# Patient Record
Sex: FEMALE | Race: WHITE | NOT HISPANIC OR LATINO | Employment: PART TIME | URBAN - METROPOLITAN AREA
[De-identification: names, ages, dates, MRNs, and addresses within clinical notes are randomized per-mention and may not be internally consistent; named-entity substitution may affect disease eponyms.]

---

## 2017-06-07 ENCOUNTER — APPOINTMENT (EMERGENCY)
Dept: RADIOLOGY | Facility: HOSPITAL | Age: 27
End: 2017-06-07
Payer: COMMERCIAL

## 2017-06-07 ENCOUNTER — HOSPITAL ENCOUNTER (EMERGENCY)
Facility: HOSPITAL | Age: 27
Discharge: HOME | End: 2017-06-07
Admitting: EMERGENCY MEDICINE
Payer: COMMERCIAL

## 2017-06-07 VITALS
TEMPERATURE: 99.3 F | OXYGEN SATURATION: 99 % | WEIGHT: 121.4 LBS | HEART RATE: 77 BPM | SYSTOLIC BLOOD PRESSURE: 135 MMHG | RESPIRATION RATE: 20 BRPM | DIASTOLIC BLOOD PRESSURE: 77 MMHG

## 2017-06-07 DIAGNOSIS — R07.9 CHEST PAIN: Primary | ICD-10-CM

## 2017-06-07 LAB
ALBUMIN SERPL BCP-MCNC: 3.8 G/DL (ref 3.5–5)
ALP SERPL-CCNC: 84 U/L (ref 46–116)
ALT SERPL W P-5'-P-CCNC: 31 U/L (ref 12–78)
ANION GAP SERPL CALCULATED.3IONS-SCNC: 9 MMOL/L (ref 4–13)
APTT PPP: 30 SECONDS (ref 24–33)
AST SERPL W P-5'-P-CCNC: 34 U/L (ref 5–45)
BASOPHILS # BLD AUTO: 0 THOUSANDS/ΜL (ref 0–0.1)
BASOPHILS NFR BLD AUTO: 0 % (ref 0–1)
BILIRUB SERPL-MCNC: 0.4 MG/DL (ref 0.2–1)
BUN SERPL-MCNC: 7 MG/DL (ref 5–25)
CALCIUM SERPL-MCNC: 8.6 MG/DL (ref 8.3–10.1)
CHLORIDE SERPL-SCNC: 101 MMOL/L (ref 100–108)
CO2 SERPL-SCNC: 29 MMOL/L (ref 21–32)
CREAT SERPL-MCNC: 0.71 MG/DL (ref 0.6–1.3)
CRP SERPL QL: <3 MG/L
DEPRECATED D DIMER PPP: 350 NG/ML (FEU) (ref 190–520)
EOSINOPHIL # BLD AUTO: 0.5 THOUSAND/ΜL (ref 0–0.61)
EOSINOPHIL NFR BLD AUTO: 6 % (ref 0–6)
ERYTHROCYTE [DISTWIDTH] IN BLOOD BY AUTOMATED COUNT: 13.8 % (ref 11.6–15.1)
ERYTHROCYTE [SEDIMENTATION RATE] IN BLOOD: 8 MM/HOUR (ref 2–25)
GFR SERPL CREATININE-BSD FRML MDRD: >60 ML/MIN/1.73SQ M
GLUCOSE SERPL-MCNC: 100 MG/DL (ref 65–140)
HCT VFR BLD AUTO: 38.8 % (ref 37–47)
HGB BLD-MCNC: 12.9 G/DL (ref 12–16)
INR PPP: 1.08 (ref 0.86–1.16)
LIPASE SERPL-CCNC: 68 U/L (ref 73–393)
LYMPHOCYTES # BLD AUTO: 3.8 THOUSANDS/ΜL (ref 0.6–4.47)
LYMPHOCYTES NFR BLD AUTO: 46 % (ref 14–44)
MCH RBC QN AUTO: 29.3 PG (ref 27–31)
MCHC RBC AUTO-ENTMCNC: 33.3 G/DL (ref 31.4–37.4)
MCV RBC AUTO: 88 FL (ref 82–98)
MONOCYTES # BLD AUTO: 0.7 THOUSAND/ΜL (ref 0.17–1.22)
MONOCYTES NFR BLD AUTO: 8 % (ref 4–12)
NEUTROPHILS # BLD AUTO: 3.3 THOUSANDS/ΜL (ref 1.85–7.62)
NEUTS SEG NFR BLD AUTO: 40 % (ref 43–75)
NRBC BLD AUTO-RTO: 0 /100 WBCS
NT-PROBNP SERPL-MCNC: 51 PG/ML
PLATELET # BLD AUTO: 272 THOUSANDS/UL (ref 130–400)
PMV BLD AUTO: 8.4 FL (ref 8.9–12.7)
POTASSIUM SERPL-SCNC: 3.6 MMOL/L (ref 3.5–5.3)
PROT SERPL-MCNC: 8.2 G/DL (ref 6.4–8.2)
PROTHROMBIN TIME: 11.4 SECONDS (ref 9.4–11.7)
RBC # BLD AUTO: 4.41 MILLION/UL (ref 4.2–5.4)
SODIUM SERPL-SCNC: 139 MMOL/L (ref 136–145)
TROPONIN I SERPL-MCNC: <0.02 NG/ML
WBC # BLD AUTO: 8.3 THOUSAND/UL (ref 4.8–10.8)

## 2017-06-07 PROCEDURE — 85025 COMPLETE CBC W/AUTO DIFF WBC: CPT | Performed by: PHYSICIAN ASSISTANT

## 2017-06-07 PROCEDURE — 85379 FIBRIN DEGRADATION QUANT: CPT | Performed by: PHYSICIAN ASSISTANT

## 2017-06-07 PROCEDURE — 86140 C-REACTIVE PROTEIN: CPT | Performed by: PHYSICIAN ASSISTANT

## 2017-06-07 PROCEDURE — 87040 BLOOD CULTURE FOR BACTERIA: CPT | Performed by: PHYSICIAN ASSISTANT

## 2017-06-07 PROCEDURE — 99285 EMERGENCY DEPT VISIT HI MDM: CPT

## 2017-06-07 PROCEDURE — 71020 HB CHEST X-RAY 2VW FRONTAL&LATL: CPT

## 2017-06-07 PROCEDURE — 83690 ASSAY OF LIPASE: CPT | Performed by: PHYSICIAN ASSISTANT

## 2017-06-07 PROCEDURE — 85730 THROMBOPLASTIN TIME PARTIAL: CPT | Performed by: PHYSICIAN ASSISTANT

## 2017-06-07 PROCEDURE — 80053 COMPREHEN METABOLIC PANEL: CPT | Performed by: PHYSICIAN ASSISTANT

## 2017-06-07 PROCEDURE — 84484 ASSAY OF TROPONIN QUANT: CPT | Performed by: PHYSICIAN ASSISTANT

## 2017-06-07 PROCEDURE — 36415 COLL VENOUS BLD VENIPUNCTURE: CPT | Performed by: PHYSICIAN ASSISTANT

## 2017-06-07 PROCEDURE — 85610 PROTHROMBIN TIME: CPT | Performed by: PHYSICIAN ASSISTANT

## 2017-06-07 PROCEDURE — 85652 RBC SED RATE AUTOMATED: CPT | Performed by: PHYSICIAN ASSISTANT

## 2017-06-07 PROCEDURE — 83880 ASSAY OF NATRIURETIC PEPTIDE: CPT | Performed by: PHYSICIAN ASSISTANT

## 2017-06-07 PROCEDURE — 93005 ELECTROCARDIOGRAM TRACING: CPT | Performed by: PHYSICIAN ASSISTANT

## 2017-06-12 LAB
BACTERIA BLD CULT: NORMAL
BACTERIA BLD CULT: NORMAL

## 2017-06-15 LAB
ATRIAL RATE: 82 BPM
P AXIS: 77 DEGREES
PR INTERVAL: 132 MS
QRS AXIS: 77 DEGREES
QRSD INTERVAL: 88 MS
QT INTERVAL: 368 MS
QTC INTERVAL: 429 MS
T WAVE AXIS: 28 DEGREES
VENTRICULAR RATE: 82 BPM

## 2017-07-12 ENCOUNTER — HOSPITAL ENCOUNTER (EMERGENCY)
Facility: HOSPITAL | Age: 27
Discharge: HOME | End: 2017-07-12
Payer: COMMERCIAL

## 2017-07-12 VITALS
TEMPERATURE: 98 F | WEIGHT: 120 LBS | HEART RATE: 93 BPM | RESPIRATION RATE: 18 BRPM | BODY MASS INDEX: 19.29 KG/M2 | SYSTOLIC BLOOD PRESSURE: 134 MMHG | OXYGEN SATURATION: 94 % | DIASTOLIC BLOOD PRESSURE: 74 MMHG | HEIGHT: 66 IN

## 2017-07-12 DIAGNOSIS — K02.9 TOOTH DECAY: Primary | ICD-10-CM

## 2017-07-12 PROCEDURE — 99282 EMERGENCY DEPT VISIT SF MDM: CPT

## 2017-07-12 RX ORDER — CLINDAMYCIN HYDROCHLORIDE 300 MG/1
300 CAPSULE ORAL 4 TIMES DAILY
Qty: 40 CAPSULE | Refills: 0 | Status: SHIPPED | OUTPATIENT
Start: 2017-07-12 | End: 2017-07-22

## 2017-07-19 ENCOUNTER — APPOINTMENT (EMERGENCY)
Dept: RADIOLOGY | Facility: HOSPITAL | Age: 27
End: 2017-07-19
Payer: COMMERCIAL

## 2017-07-19 ENCOUNTER — HOSPITAL ENCOUNTER (EMERGENCY)
Facility: HOSPITAL | Age: 27
Discharge: HOME | End: 2017-07-19
Attending: EMERGENCY MEDICINE | Admitting: EMERGENCY MEDICINE
Payer: COMMERCIAL

## 2017-07-19 VITALS
OXYGEN SATURATION: 100 % | RESPIRATION RATE: 18 BRPM | BODY MASS INDEX: 19.37 KG/M2 | TEMPERATURE: 98 F | WEIGHT: 120 LBS | SYSTOLIC BLOOD PRESSURE: 113 MMHG | DIASTOLIC BLOOD PRESSURE: 70 MMHG | HEART RATE: 108 BPM

## 2017-07-19 DIAGNOSIS — F11.90 HEROIN USE: Primary | ICD-10-CM

## 2017-07-19 DIAGNOSIS — F41.9 ANXIETY: ICD-10-CM

## 2017-07-19 LAB — HCG UR QL: NEGATIVE

## 2017-07-19 PROCEDURE — 71020 HB CHEST X-RAY 2VW FRONTAL&LATL: CPT

## 2017-07-19 PROCEDURE — 93005 ELECTROCARDIOGRAM TRACING: CPT | Performed by: EMERGENCY MEDICINE

## 2017-07-19 PROCEDURE — 99285 EMERGENCY DEPT VISIT HI MDM: CPT

## 2017-07-19 PROCEDURE — 81025 URINE PREGNANCY TEST: CPT | Performed by: EMERGENCY MEDICINE

## 2017-07-19 RX ORDER — ALBUTEROL SULFATE 90 UG/1
1-2 AEROSOL, METERED RESPIRATORY (INHALATION) EVERY 6 HOURS PRN
Qty: 1 INHALER | Refills: 0 | Status: SHIPPED | OUTPATIENT
Start: 2017-07-19

## 2017-07-29 LAB
ATRIAL RATE: 84 BPM
P AXIS: 83 DEGREES
PR INTERVAL: 128 MS
QRS AXIS: 81 DEGREES
QRSD INTERVAL: 84 MS
QT INTERVAL: 364 MS
QTC INTERVAL: 430 MS
T WAVE AXIS: 8 DEGREES
VENTRICULAR RATE: 84 BPM

## 2017-11-09 ENCOUNTER — APPOINTMENT (EMERGENCY)
Dept: RADIOLOGY | Facility: HOSPITAL | Age: 27
End: 2017-11-09
Payer: COMMERCIAL

## 2017-11-09 ENCOUNTER — HOSPITAL ENCOUNTER (OUTPATIENT)
Facility: HOSPITAL | Age: 27
Setting detail: OBSERVATION
Discharge: LEFT BEFORE DISCHARGE | End: 2017-11-09
Attending: EMERGENCY MEDICINE | Admitting: FAMILY MEDICINE
Payer: COMMERCIAL

## 2017-11-09 ENCOUNTER — APPOINTMENT (OUTPATIENT)
Dept: NON INVASIVE DIAGNOSTICS | Facility: HOSPITAL | Age: 27
End: 2017-11-09
Attending: FAMILY MEDICINE
Payer: COMMERCIAL

## 2017-11-09 VITALS
DIASTOLIC BLOOD PRESSURE: 62 MMHG | HEART RATE: 72 BPM | RESPIRATION RATE: 12 BRPM | WEIGHT: 118 LBS | BODY MASS INDEX: 19.66 KG/M2 | HEIGHT: 65 IN | SYSTOLIC BLOOD PRESSURE: 109 MMHG | OXYGEN SATURATION: 99 % | TEMPERATURE: 98.1 F

## 2017-11-09 DIAGNOSIS — K04.7 DENTAL ABSCESS: ICD-10-CM

## 2017-11-09 DIAGNOSIS — Z86.79 HISTORY OF ENDOCARDITIS: ICD-10-CM

## 2017-11-09 DIAGNOSIS — R07.9 CHEST PAIN: Primary | ICD-10-CM

## 2017-11-09 PROBLEM — R35.0 URINARY FREQUENCY: Status: ACTIVE | Noted: 2017-11-09

## 2017-11-09 PROBLEM — F19.90 IV DRUG USER: Status: ACTIVE | Noted: 2017-11-09

## 2017-11-09 PROBLEM — K08.89 TOOTHACHE: Status: ACTIVE | Noted: 2017-11-09

## 2017-11-09 PROBLEM — Z72.0 TOBACCO USE: Status: ACTIVE | Noted: 2017-11-09

## 2017-11-09 LAB
ALBUMIN SERPL BCP-MCNC: 3.7 G/DL (ref 3.5–5)
ALP SERPL-CCNC: 82 U/L (ref 46–116)
ALT SERPL W P-5'-P-CCNC: 46 U/L (ref 12–78)
ANION GAP SERPL CALCULATED.3IONS-SCNC: 10 MMOL/L (ref 4–13)
AST SERPL W P-5'-P-CCNC: 36 U/L (ref 5–45)
BASOPHILS # BLD AUTO: 0 THOUSANDS/ΜL (ref 0–0.1)
BASOPHILS NFR BLD AUTO: 1 % (ref 0–1)
BILIRUB SERPL-MCNC: 0.2 MG/DL (ref 0.2–1)
BUN SERPL-MCNC: 9 MG/DL (ref 5–25)
CALCIUM SERPL-MCNC: 9.1 MG/DL (ref 8.3–10.1)
CHLORIDE SERPL-SCNC: 103 MMOL/L (ref 100–108)
CHOLEST SERPL-MCNC: 132 MG/DL (ref 50–200)
CO2 SERPL-SCNC: 27 MMOL/L (ref 21–32)
CREAT SERPL-MCNC: 0.53 MG/DL (ref 0.6–1.3)
EOSINOPHIL # BLD AUTO: 0.4 THOUSAND/ΜL (ref 0–0.61)
EOSINOPHIL NFR BLD AUTO: 5 % (ref 0–6)
ERYTHROCYTE [DISTWIDTH] IN BLOOD BY AUTOMATED COUNT: 14.8 % (ref 11.6–15.1)
EXT PREG TEST URINE: NEGATIVE
GFR SERPL CREATININE-BSD FRML MDRD: 129 ML/MIN/1.73SQ M
GLUCOSE SERPL-MCNC: 101 MG/DL (ref 65–140)
HCT VFR BLD AUTO: 33.5 % (ref 37–47)
HDLC SERPL-MCNC: 30 MG/DL (ref 40–60)
HGB BLD-MCNC: 11.1 G/DL (ref 12–16)
LDLC SERPL CALC-MCNC: 73 MG/DL (ref 0–100)
LYMPHOCYTES # BLD AUTO: 3.7 THOUSANDS/ΜL (ref 0.6–4.47)
LYMPHOCYTES NFR BLD AUTO: 53 % (ref 14–44)
MCH RBC QN AUTO: 29.7 PG (ref 27–31)
MCHC RBC AUTO-ENTMCNC: 33.1 G/DL (ref 31.4–37.4)
MCV RBC AUTO: 90 FL (ref 82–98)
MONOCYTES # BLD AUTO: 0.5 THOUSAND/ΜL (ref 0.17–1.22)
MONOCYTES NFR BLD AUTO: 8 % (ref 4–12)
NEUTROPHILS # BLD AUTO: 2.4 THOUSANDS/ΜL (ref 1.85–7.62)
NEUTS SEG NFR BLD AUTO: 34 % (ref 43–75)
NRBC BLD AUTO-RTO: 0 /100 WBCS
PLATELET # BLD AUTO: 216 THOUSANDS/UL (ref 130–400)
PLATELET BLD QL SMEAR: ADEQUATE
PMV BLD AUTO: 8.7 FL (ref 8.9–12.7)
POTASSIUM SERPL-SCNC: 3.9 MMOL/L (ref 3.5–5.3)
PROT SERPL-MCNC: 8.1 G/DL (ref 6.4–8.2)
RBC # BLD AUTO: 3.75 MILLION/UL (ref 4.2–5.4)
SODIUM SERPL-SCNC: 140 MMOL/L (ref 136–145)
TRIGL SERPL-MCNC: 143 MG/DL
TROPONIN I SERPL-MCNC: <0.02 NG/ML
WBC # BLD AUTO: 7 THOUSAND/UL (ref 4.8–10.8)

## 2017-11-09 PROCEDURE — 96361 HYDRATE IV INFUSION ADD-ON: CPT

## 2017-11-09 PROCEDURE — 81025 URINE PREGNANCY TEST: CPT | Performed by: EMERGENCY MEDICINE

## 2017-11-09 PROCEDURE — 80061 LIPID PANEL: CPT | Performed by: FAMILY MEDICINE

## 2017-11-09 PROCEDURE — 80053 COMPREHEN METABOLIC PANEL: CPT | Performed by: EMERGENCY MEDICINE

## 2017-11-09 PROCEDURE — 96365 THER/PROPH/DIAG IV INF INIT: CPT

## 2017-11-09 PROCEDURE — 99285 EMERGENCY DEPT VISIT HI MDM: CPT

## 2017-11-09 PROCEDURE — 96375 TX/PRO/DX INJ NEW DRUG ADDON: CPT

## 2017-11-09 PROCEDURE — 71275 CT ANGIOGRAPHY CHEST: CPT

## 2017-11-09 PROCEDURE — 85025 COMPLETE CBC W/AUTO DIFF WBC: CPT | Performed by: EMERGENCY MEDICINE

## 2017-11-09 PROCEDURE — 93005 ELECTROCARDIOGRAM TRACING: CPT | Performed by: EMERGENCY MEDICINE

## 2017-11-09 PROCEDURE — 84484 ASSAY OF TROPONIN QUANT: CPT | Performed by: EMERGENCY MEDICINE

## 2017-11-09 PROCEDURE — 36415 COLL VENOUS BLD VENIPUNCTURE: CPT | Performed by: EMERGENCY MEDICINE

## 2017-11-09 PROCEDURE — 71020 HB CHEST X-RAY 2VW FRONTAL&LATL: CPT

## 2017-11-09 RX ORDER — CLINDAMYCIN HYDROCHLORIDE 150 MG/1
450 CAPSULE ORAL EVERY 8 HOURS SCHEDULED
Qty: 63 CAPSULE | Refills: 0 | Status: SHIPPED | OUTPATIENT
Start: 2017-11-09 | End: 2017-11-16

## 2017-11-09 RX ORDER — NICOTINE 21 MG/24HR
1 PATCH, TRANSDERMAL 24 HOURS TRANSDERMAL DAILY
Qty: 28 PATCH | Refills: 0 | Status: SHIPPED | OUTPATIENT
Start: 2017-11-09 | End: 2021-06-16

## 2017-11-09 RX ORDER — ALBUTEROL SULFATE 90 UG/1
2 AEROSOL, METERED RESPIRATORY (INHALATION) EVERY 4 HOURS PRN
Status: DISCONTINUED | OUTPATIENT
Start: 2017-11-09 | End: 2017-11-09 | Stop reason: HOSPADM

## 2017-11-09 RX ORDER — ONDANSETRON 2 MG/ML
4 INJECTION INTRAMUSCULAR; INTRAVENOUS EVERY 6 HOURS PRN
Status: DISCONTINUED | OUTPATIENT
Start: 2017-11-09 | End: 2017-11-09 | Stop reason: HOSPADM

## 2017-11-09 RX ORDER — NICOTINE 21 MG/24HR
1 PATCH, TRANSDERMAL 24 HOURS TRANSDERMAL DAILY
Status: DISCONTINUED | OUTPATIENT
Start: 2017-11-09 | End: 2017-11-09 | Stop reason: HOSPADM

## 2017-11-09 RX ORDER — CLINDAMYCIN PHOSPHATE 600 MG/50ML
600 INJECTION INTRAVENOUS EVERY 8 HOURS
Status: DISCONTINUED | OUTPATIENT
Start: 2017-11-09 | End: 2017-11-09 | Stop reason: HOSPADM

## 2017-11-09 RX ORDER — KETOROLAC TROMETHAMINE 30 MG/ML
30 INJECTION, SOLUTION INTRAMUSCULAR; INTRAVENOUS EVERY 6 HOURS PRN
Status: DISCONTINUED | OUTPATIENT
Start: 2017-11-09 | End: 2017-11-09 | Stop reason: HOSPADM

## 2017-11-09 RX ORDER — ASPIRIN 81 MG/1
324 TABLET, CHEWABLE ORAL DAILY
Status: DISCONTINUED | OUTPATIENT
Start: 2017-11-09 | End: 2017-11-09 | Stop reason: HOSPADM

## 2017-11-09 RX ORDER — KETOROLAC TROMETHAMINE 30 MG/ML
15 INJECTION, SOLUTION INTRAMUSCULAR; INTRAVENOUS ONCE
Status: COMPLETED | OUTPATIENT
Start: 2017-11-09 | End: 2017-11-09

## 2017-11-09 RX ORDER — CLINDAMYCIN PHOSPHATE 600 MG/50ML
600 INJECTION INTRAVENOUS ONCE
Status: COMPLETED | OUTPATIENT
Start: 2017-11-09 | End: 2017-11-09

## 2017-11-09 RX ORDER — ACETAMINOPHEN 325 MG/1
650 TABLET ORAL EVERY 4 HOURS PRN
Status: DISCONTINUED | OUTPATIENT
Start: 2017-11-09 | End: 2017-11-09 | Stop reason: HOSPADM

## 2017-11-09 RX ADMIN — SODIUM CHLORIDE 1000 ML: 0.9 INJECTION, SOLUTION INTRAVENOUS at 05:26

## 2017-11-09 RX ADMIN — CLINDAMYCIN PHOSPHATE 600 MG: 12 INJECTION, SOLUTION INTRAMUSCULAR; INTRAVENOUS at 06:36

## 2017-11-09 RX ADMIN — IOHEXOL 85 ML: 350 INJECTION, SOLUTION INTRAVENOUS at 06:56

## 2017-11-09 RX ADMIN — KETOROLAC TROMETHAMINE 15 MG: 30 INJECTION, SOLUTION INTRAMUSCULAR at 05:27

## 2017-11-09 NOTE — H&P
History and Physical - [facility]    Patient Information: Bernard Majano 32 y o  female MRN: [id number]  Unit/Bed#: [address] [address] Encounter: [id number]  Admitting Physician: Maximo Hyde DO  PCP: No primary care provider on file  Date of Admission:  11/09/17        Hospital Problem List:     Principal Problem:    Chest pain  Active Problems:    Tobacco use    IV drug user    Urinary frequency    Tooth infection      Assessment/Plan:    1  Chest pain - admit patient to telemetry  Troponin x1 has been negative  Obtain troponin x2 More while here  Consult Cardiology  Place patient on aspirin  Obtain exercise stress test and echocardiogram for further evaluation  Monitor closely for any worsening of pain  CTA chest ruled out PE     2  Urinary frequency - check U/A to r/o UTI  No fevers and no leukocytosis    3  Tooth infection - patient received a dose of IV clindamycin in the ER  Will continue with IV clindamycin while here  advised patient to set up an appointment with a dentist as soon as possible for further management of this    4  IV drug user - patient advised to stop using drugs  She denies any other drug use besides heroin  recommended outpatient rehab/counseling  Offered psychiatry consultation or meeting with Suzan Martel which patient refused as she is not interested in quitting at this time  5  Tobacco use - nicotine patch  Smoking cessation d/w patient    6  Right lower lobe nodule - incidental finding on CTA chest   Patient advised to get established with a PCP who can continue monitoring this if appropriate  VTE Prophylaxis: Enoxaparin (Lovenox)  / sequential compression device   Code Status: Level 1 - Full Code    Anticipated Length of Stay:  Patient will be admitted on an Observation basis with an anticipated length of stay of 1 midnights  Total Time for Visit, including Counseling / Coordination of Care: 45 minutes    Greater than 50% of this total time spent on direct patient counseling and coordination of care  Chief Complaint:     Chest Pain (patient has had intermittant left side chest pain all day, worsened when she went to lay down, describes as tightness and cramping)    of Present Illness:    Julito Zhou is a 32 y o  female who presents with intermittent left-sided chest pain x2 years which worsened yesterday  She states that the pain is under her left breast and feels like a contraction in my chest  She states that the pain level was 7-8/10 in intensity  She also reports shortness of breath with it  Denies any radiation of the pain  Patient had similar pain when she was diagnosed with endocarditis about 2 years ago  Patient denies any fevers, chills but does report a tooth infection that started recently  patient uses IV heroin 2-3 times daily  She states that she does not share needles but does not use a clean needle each time either  Her last heroin use was yesterday  Review of Systems:    Review of Systems   Constitutional: Negative for appetite change, chills and fever  HENT: Positive for dental problem  Negative for facial swelling, nosebleeds, sore throat and trouble swallowing  Eyes: Negative for photophobia and visual disturbance  Respiratory: Positive for shortness of breath  Negative for chest tightness  Cardiovascular: Positive for chest pain  Negative for palpitations and leg swelling  Gastrointestinal: Negative for abdominal pain, blood in stool, nausea and vomiting  Genitourinary: Positive for frequency  Negative for dysuria and hematuria  Musculoskeletal: Negative for neck stiffness  Skin: Negative for wound  Neurological: Negative for dizziness, syncope and speech difficulty  Hematological: Does not bruise/bleed easily  Psychiatric/Behavioral: Negative for agitation         Past Medical and Surgical History:     Past Medical History:   Diagnosis Date    Drug abuse, IV     Endocarditis Past Surgical History:   Procedure Laterality Date    TONSILLECTOMY         Meds/Allergies:    PTA meds:   Prior to Admission Medications   Prescriptions Last Dose Informant Patient Reported? Taking? albuterol (PROVENTIL HFA,VENTOLIN HFA) 90 mcg/act inhaler   No No   Sig: Inhale 1-2 puffs every 6 (six) hours as needed for wheezing      Facility-Administered Medications: None       Allergies: No Known Allergies  History:     Marital Status: Single     Substance Use History:   History   Alcohol Use    Yes     Comment: occasional     History   Smoking Status    Current Every Day Smoker    Packs/day: 0 50   Smokeless Tobacco    Never Used     History   Drug Use    Types: Heroin     Comment: last use of heroin today, 2 bags       Family History:    History reviewed  No pertinent family history  Physical Exam:     Vitals:   Blood Pressure: 109/62 (11/09/17 0911)  Pulse: 72 (11/09/17 0911)  Temperature: 98 1 °F (36 7 °C) (11/09/17 0911)  Temp Source: Oral (11/09/17 0911)  Respirations: 12 (11/09/17 0911)  Height: 5' 5" (165 1 cm) (11/09/17 0418)  Weight - Scale: 53 5 kg (118 lb) (11/09/17 0418)  SpO2: 99 % (11/09/17 0911)    Physical Exam   Constitutional: She is oriented to person, place, and time  She appears well-developed and well-nourished  No distress  HENT:   Head: Normocephalic and atraumatic  Poor dentition  Broken tooth noted  (+) mild tenderness noted along the lower gum line and right mandible with no surrounding erythema, warmth  No fluctuance noted   Eyes: EOM are normal  Right eye exhibits no discharge  Left eye exhibits no discharge  No scleral icterus  Neck: Neck supple  No tracheal deviation present  Cardiovascular: Normal rate and regular rhythm  Pulmonary/Chest: Effort normal and breath sounds normal  No respiratory distress  She has no wheezes  She has no rales  Abdominal: Soft  Bowel sounds are normal  She exhibits no distension  There is no tenderness     Musculoskeletal: She exhibits no edema  Neurological: She is alert and oriented to person, place, and time  No cranial nerve deficit  Skin: Skin is dry  She is not diaphoretic  Psychiatric: She has a normal mood and affect  Lab Results: I have personally reviewed pertinent reports  Results from last 7 days  Lab Units 11/09/17  0515   WBC Thousand/uL 7 00   HEMOGLOBIN g/dL 11 1*   HEMATOCRIT % 33 5*   PLATELETS Thousands/uL 216   NEUTROS PCT % 34*   LYMPHS PCT % 53*   MONOS PCT % 8   EOS PCT % 5       Results from last 7 days  Lab Units 11/09/17  0515   SODIUM mmol/L 140   POTASSIUM mmol/L 3 9   CHLORIDE mmol/L 103   CO2 mmol/L 27   BUN mg/dL 9   CREATININE mg/dL 0 53*   CALCIUM mg/dL 9 1   TOTAL PROTEIN g/dL 8 1   BILIRUBIN TOTAL mg/dL 0 20   ALK PHOS U/L 82   ALT U/L 46   AST U/L 36   GLUCOSE RANDOM mg/dL 101           Imaging: I have personally reviewed pertinent reports  Xr Chest 2 Views    Result Date: 11/9/2017  Narrative: CHEST - DUAL ENERGY INDICATION:  Chest pain  COMPARISON:  7/19/2017 VIEWS:  PA (including soft tissue/bone algorithms) and lateral projections IMAGES:  4 FINDINGS:     Cardiomediastinal silhouette appears unremarkable  The lungs are clear  No pneumothorax or pleural effusion  Visualized osseous structures appear within normal limits for the patient's age  Impression: No active pulmonary disease  Workstation performed: [id number]     Fair value Siemens Chest Pe Study    Result Date: 11/9/2017  Narrative: CTA - CHEST WITH IV CONTRAST - PULMONARY ANGIOGRAM INDICATION: Left-sided chest pain  COMPARISON: None  TECHNIQUE: CTA examination of the chest was performed using angiographic technique according to a protocol specifically tailored to evaluate for pulmonary embolism  Reformatted images were created in axial, sagittal, and coronal planes  In addition, coronal 3D MIP postprocessing was performed on the acquisition scanner    Radiation dose length product (DLP) for this visit:  384 43 mGy-cm   This examination, like all CT scans performed in the [facility], was performed utilizing techniques to minimize radiation dose exposure, including the use of iterative  reconstruction and automated exposure control  IV Contrast:  85 mL of iohexol (OMNIPAQUE)      FINDINGS: PULMONARY ARTERIAL TREE:  No pulmonary embolus is seen  LUNGS:  No infiltrates  There is a 5 mm right lower lobe superior segment nodule on image 3/28  Tiny calcified granuloma noted in the right upper lobe  There is no tracheal or endobronchial lesion  PLEURA:  Unremarkable  HEART/AORTA:  Unremarkable for patient's age  MEDIASTINUM AND PATO:  Unremarkable  CHEST WALL AND LOWER NECK:       Normal  VISUALIZED STRUCTURES IN THE UPPER ABDOMEN:  Unremarkable  OSSEOUS STRUCTURES:  No acute fracture or destructive osseous lesion  Impression: No acute pathology  No pulmonary embolism  Right lower lobe 5 mm nodule  Based on current Fleischner Society 2017 Guidelines on incidental pulmonary nodule, in this patient who is less than 39years of age, management decisions should be made on a case by case basis, keeping in mind that infectious causes are more likely than cancer and that use of serial CT should be minimized  Workstation performed: [id number]       CTA ED chest PE study   Final Result      No acute pathology  No pulmonary embolism  Right lower lobe 5 mm nodule  Based on current Fleischner Society 2017 Guidelines on incidental pulmonary nodule, in this patient who is less than 39years of age, management decisions should be made on a case by case basis, keeping in mind that    infectious causes are more likely than cancer and that use of serial CT should be minimized  Workstation performed: [id number]         XR chest 2 views   Final Result      No active pulmonary disease           Workstation performed: [id number]             EKG, Pathology, and Other Studies Reviewed on Admission:   · EKG shows sinus rhythm with no ST elevations    Allscripts Records Reviewed: No     ** Please Note: Dragon 360 Dictation voice to text software may have been used in the creation of this document   **

## 2017-11-09 NOTE — CONSULTS
Consultation - Pulmonary Medicine - Family Medicine Resident  Lissett Taylor 32 y o  female MRN: [id number]  Unit/Bed#: [address] [place] [address] Encounter: [id number]      Assessment/Plan:   32 year female ordered with past medical history of chronic heroin use here for left-sided atypical chest pain:    Atypical chest pain: although pt is low risk for ACS, do need to r/o ACS - stress test and echo ordered; will f/u;  Continue to monitor on tele  C/w daily aspirin until ACS ruled out  Also consider MSK source for the pain  Normocytic Anemia  Chronic heroin user  Hx of endocarditis    Will continue to follow you      History of Present Illness   Physician Requesting Consult: Ivory Ellis DO  Reason for Consult / Principal Problem: chest pain    HPI: Lissett Taylor is a 32y o  year old female who presents with intermittent chest pain  Patient reports that last night she started to experience a muscle spasm like left-sided chest pain just below the left breast area that started as she was lying down, she reports that this pain lasts for couple seconds, it usually resolves when she sits up or leans forward, and starts back again when she lays back down, and she reports this has been intermittent over night  She has no pain at this time  There is no radiation of this pain  There is no associated shortness of breath at rest, however she does get worsening of the pain on deep breathing  No diaphoresis or nausea or vomiting with this pain  Pain is not worsened by exertion or made better by rest   Patient denies any fever or chills  Patient reports that she does have a history of endocarditis 2 years ago up, and was treated by antibiotics after thorough workup, since then she has had no cardiac issues  No abdominal pain  No melena  Patient reports that she is a chronic IV heroin user, she uses 3 bags of heroin every day since the last 2 years, she reports the last time she had a her in was yesterday    She denies any other drug use  She denies any alcohol or smoking  She has not been sick recently, although she does report that she has had tooth infection since past 2 days (not on any antibiotics), she reports that she gets recurrent tooth infections  Review of Systems   Constitutional: Negative for chills and fever  HENT: Negative for congestion, sinus pressure and sore throat  Respiratory: Negative for chest tightness, shortness of breath and wheezing  Cardiovascular: Positive for chest pain  Negative for palpitations and leg swelling  Gastrointestinal: Negative for abdominal pain, diarrhea, nausea and vomiting  Genitourinary: Negative for dysuria  Neurological: Negative for light-headedness  Historical Information   Past Medical History:   Diagnosis Date    Drug abuse, IV     Endocarditis      Past Surgical History:   Procedure Laterality Date    TONSILLECTOMY       Social History   History   Alcohol Use    Yes     Comment: occasional     History   Drug Use    Types: Heroin     Comment: last use of heroin today, 2 bags     History   Smoking Status    Current Every Day Smoker    Packs/day: 0 50   Smokeless Tobacco    Never Used         Family History: History reviewed  No pertinent family history      Meds/Allergies     Current Facility-Administered Medications:     acetaminophen (TYLENOL) tablet 650 mg, 650 mg, Oral, Q4H PRN, Deepa Revankar, DO    albuterol (PROVENTIL HFA,VENTOLIN HFA) inhaler 2 puff, 2 puff, Inhalation, Q4H PRN, Deepa Revankar, DO    aspirin chewable tablet 325 mg, 325 mg, Oral, Daily, Deepa Revankar, DO    clindamycin (CLEOCIN) IVPB (premix) 600 mg, 600 mg, Intravenous, Q8H, Deepa Revankar, DO    enoxaparin (LOVENOX) subcutaneous injection 40 mg, 40 mg, Subcutaneous, Daily, Deepa Revankar, DO    ketorolac (TORADOL) 30 mg/mL injection 30 mg, 30 mg, Intravenous, Q6H PRN, Deepa Revankar, DO    nicotine (NICODERM CQ) 14 mg/24hr TD 24 hr patch 1 patch, 1 patch, Transdermal, Daily, Deepa Cotto,     ondansetron (ZOFRAN) injection 4 mg, 4 mg, Intravenous, Q6H PRN, Deepa Cotto DO    Prior to Admission medications    Medication Sig Start Date End Date Taking? Authorizing Provider   albuterol (PROVENTIL HFA,VENTOLIN HFA) 90 mcg/act inhaler Inhale 1-2 puffs every 6 (six) hours as needed for wheezing 7/19/17   Sandra Anepu, DO       No Known Allergies    Objective   Vitals: Blood pressure 109/62, pulse 72, temperature 98 1 °F (36 7 °C), temperature source Oral, resp  rate 12, height 5' 5" (1 651 m), weight 53 5 kg (118 lb), last menstrual period 10/12/2017, SpO2 99 %, not currently breastfeeding  ,Body mass index is 19 64 kg/m²  Intake/Output Summary (Last 24 hours) at 11/09/17 0951  Last data filed at 11/09/17 0706   Gross per 24 hour   Intake             1100 ml   Output                0 ml   Net             1100 ml     Invasive Devices          No matching active lines, drains, or airways          Physical Exam   Constitutional: No distress  HENT:   Head: Normocephalic and atraumatic  Eyes: Conjunctivae are normal    Neck: Neck supple  Cardiovascular: Normal rate, regular rhythm, normal heart sounds and intact distal pulses  No murmur heard  Pulmonary/Chest: Effort normal and breath sounds normal  No respiratory distress  She has no wheezes  She has no rales  Abdominal: Soft  Bowel sounds are normal  She exhibits no distension  There is no tenderness  There is no rebound  Musculoskeletal: Normal range of motion  She exhibits no edema, tenderness or deformity  Neurological: She is alert  Skin: Skin is warm and dry  She is not diaphoretic         Lab Results: ABG: No results found for: PHART, IGZ1PDF, PO2ART, ESB4DSO, V4OTHETH, BEART, SOURCE, BNP: No results found for: BNP, CBC: Lab Results   Component Value Date    WBC 7 00 11/09/2017    HGB 11 1 (L) 11/09/2017    HCT 33 5 (L) 11/09/2017    MCV 90 11/09/2017     11/09/2017    MCH 29 7 11/09/2017    MCHC 33 1 11/09/2017    RDW 14 8 11/09/2017    MPV 8 7 (L) 11/09/2017    NRBC 0 11/09/2017   , CMP: Lab Results   Component Value Date     11/09/2017    K 3 9 11/09/2017     11/09/2017    CO2 27 11/09/2017    ANIONGAP 10 11/09/2017    BUN 9 11/09/2017    CREATININE 0 53 (L) 11/09/2017    GLUCOSE 101 11/09/2017    CALCIUM 9 1 11/09/2017    AST 36 11/09/2017    ALT 46 11/09/2017    ALKPHOS 82 11/09/2017    PROT 8 1 11/09/2017    ALBUMIN 3 7 11/09/2017    BILITOT 0 20 11/09/2017    EGFR 129 11/09/2017   , PT/INR:   Lab Results   Component Value Date    INR 1 08 06/07/2017   , Troponin: No components found for: TROPONIN    Imaging Studies: I have personally reviewed pertinent reports  and I have personally reviewed pertinent films in PACS    EKG, Pathology, and Other Studies: I have personally reviewed pertinent reports      VTE Prophylaxis: Enoxaparin (Lovenox)    Code Status: Level 1 - Full Code    Case discussed with Dr Jessica Harding

## 2017-11-09 NOTE — ED PROVIDER NOTES
History  Chief Complaint   Patient presents with    Chest Pain     patient has had intermittant left side chest pain all day, worsened when she went to lay down, describes as tightness and cramping     Pt in ER with c/o intermittent chest pain, worse yesterday  She states that she has had similar pain x 2 years when she was diagnosed with endocarditis 2 years ago, and she has had similar pain intermittently  Pt with a dental infection that started recently, with some swelling in her right mandible  She denies fevers/chills  She admits to IVDA - heroin, last use was yesterday            Prior to Admission Medications   Prescriptions Last Dose Informant Patient Reported? Taking? albuterol (PROVENTIL HFA,VENTOLIN HFA) 90 mcg/act inhaler   No No   Sig: Inhale 1-2 puffs every 6 (six) hours as needed for wheezing      Facility-Administered Medications: None       Past Medical History:   Diagnosis Date    Drug abuse, IV     Endocarditis        Past Surgical History:   Procedure Laterality Date    TONSILLECTOMY         History reviewed  No pertinent family history  I have reviewed and agree with the history as documented  Social History   Substance Use Topics    Smoking status: Current Every Day Smoker     Packs/day: 0 50    Smokeless tobacco: Never Used    Alcohol use Yes      Comment: occasional        Review of Systems   Constitutional: Negative for chills and fever  HENT: Positive for dental problem and facial swelling  Negative for sore throat and trouble swallowing  Respiratory: Positive for shortness of breath  Negative for cough  Cardiovascular: Positive for chest pain  All other systems reviewed and are negative        Physical Exam  ED Triage Vitals [11/09/17 0418]   Temperature Pulse Respirations Blood Pressure SpO2   97 5 °F (36 4 °C) 85 20 152/79 100 %      Temp Source Heart Rate Source Patient Position - Orthostatic VS BP Location FiO2 (%)   Tympanic Monitor Sitting Right arm -- Pain Score       No Pain           Orthostatic Vital Signs  Vitals:    11/09/17 0418 11/09/17 0530   BP: 152/79 123/73   Pulse: 85 86   Patient Position - Orthostatic VS: Sitting        Physical Exam   Constitutional: She is oriented to person, place, and time  She appears well-developed and well-nourished  HENT:   Head: Atraumatic  Right mandibular swelling, no overlying cellulitis   Eyes: EOM are normal  Pupils are equal, round, and reactive to light  Cardiovascular: Normal rate, regular rhythm and normal heart sounds  Pulmonary/Chest: Effort normal and breath sounds normal  No respiratory distress  She has no wheezes  She has no rales  She exhibits tenderness  Abdominal: Soft  Bowel sounds are normal    Neurological: She is alert and oriented to person, place, and time  Nursing note and vitals reviewed  ED Medications  Medications   clindamycin (CLEOCIN) IVPB (premix) 600 mg (not administered)   sodium chloride 0 9 % bolus 1,000 mL (1,000 mL Intravenous New Bag 11/9/17 0526)   ketorolac (TORADOL) 30 mg/mL injection 15 mg (15 mg Intravenous Given 11/9/17 0527)       Diagnostic Studies  Results Reviewed     Procedure Component Value Units Date/Time    Troponin I [[id number]]  (Normal) Collected:  11/09/17 0515    Lab Status:  Final result Specimen:  Blood from Line Updated:  11/09/17 0552     Troponin I <0 02 ng/mL     Narrative:         Siemens Chemistry analyzer 99% cutoff is > 0 04 ng/mL in network labs    o cTnI 99% cutoff is useful only when applied to patients in the clinical setting of myocardial ischemia  o cTnI 99% cutoff should be interpreted in the context of clinical history, ECG findings and possibly cardiac imaging to establish correct diagnosis  o cTnI 99% cutoff may be suggestive but clearly not indicative of a coronary event without the clinical setting of myocardial ischemia      CBC and differential [[id number]]  (Abnormal) Collected:  11/09/17 0515    Lab Status:  Final result Specimen:  Blood from Line Updated:  11/09/17 0540     WBC 7 00 Thousand/uL      RBC 3 75 (L) Million/uL      Hemoglobin 11 1 (L) g/dL      Hematocrit 33 5 (L) %      MCV 90 fL      MCH 29 7 pg      MCHC 33 1 g/dL      RDW 14 8 %      MPV 8 7 (L) fL      Platelets 396 Thousands/uL      nRBC 0 /100 WBCs      Neutrophils Relative 34 (L) %      Lymphocytes Relative 53 (H) %      Monocytes Relative 8 %      Eosinophils Relative 5 %      Basophils Relative 1 %      Neutrophils Absolute 2 40 Thousands/µL      Lymphocytes Absolute 3 70 Thousands/µL      Monocytes Absolute 0 50 Thousand/µL      Eosinophils Absolute 0 40 Thousand/µL      Basophils Absolute 0 00 Thousands/µL     POCT pregnancy, urine [[id number]]  (Normal) Resulted:  11/09/17 0535    Lab Status:  Final result Updated:  11/09/17 0535     EXT PREG TEST UR (Ref: Negative) Negative    Comprehensive metabolic panel [[id number]] Collected:  11/09/17 0515    Lab Status: In process Specimen:  Blood from Line Updated:  11/09/17 0519                 XR chest 2 views    (Results Pending)   CTA ED chest PE study    (Results Pending)              Procedures  ECG 12 Lead Documentation  Date/Time: 11/9/2017 6:44 AM  Performed by: Zahra Reed by: Yaneth Ragland     Indications / Diagnosis:  Chest pain  ECG reviewed by me, the ED Provider: yes    Patient location:  ED  Previous ECG:     Previous ECG:  Unavailable    Comparison to cardiac monitor: Yes    Interpretation:     Interpretation: normal    Rate:     ECG rate:  87bpm    ECG rate assessment: normal    Rhythm:     Rhythm: sinus rhythm             Phone Contacts  ED Phone Contact    ED Course  ED Course                                [facility] Time    Disposition  Final diagnoses:   None     ED Disposition     None      Follow-up Information    None       Patient's Medications   Discharge Prescriptions    No medications on file     No discharge procedures on file      ED Provider  Electronically Signed by           Wilfredo Munoz,   11/14/17 1003

## 2017-11-09 NOTE — PROGRESS NOTES
Patient left AMA, ambulatory, before admission could be completed  All risks were explained to her, she stated understanding and signed the AMA form  Both IV'S were removed

## 2017-11-09 NOTE — DISCHARGE SUMMARY
[facility]    Patient Information: Cortez Waterman 32 y o  female MRN: [id number]  Unit/Bed#: [address] [address] Encounter: [id number]    Discharging Physician / Practitioner: Maude Carlson DO  PCP: No primary care provider on file  Admission Date: 11/9/2017  Discharge Date: 11/09/17    Reason for Admission: Chest Pain (patient has had intermittant left side chest pain all day, worsened when she went to lay down, describes as tightness and cramping)      Discharge Diagnoses:     Principal Problem:    Chest pain  Active Problems:    Tobacco use    IV drug user    Urinary frequency    Tooth infection  Resolved Problems:    * No resolved hospital problems  *      Consultations During Hospital Stay:  IP CONSULT TO CARDIOLOGY    Procedures Performed:     · none    Significant Findings:     · See hospital course    Imaging while in hospital:    Xr Chest 2 Views    Result Date: 11/9/2017  Narrative: CHEST - DUAL ENERGY INDICATION:  Chest pain  COMPARISON:  7/19/2017 VIEWS:  PA (including soft tissue/bone algorithms) and lateral projections IMAGES:  4 FINDINGS:     Cardiomediastinal silhouette appears unremarkable  The lungs are clear  No pneumothorax or pleural effusion  Visualized osseous structures appear within normal limits for the patient's age  Impression: No active pulmonary disease  Workstation performed: [id number]     [place] e Chest Pe Study    Result Date: 11/9/2017  Narrative: CTA - CHEST WITH IV CONTRAST - PULMONARY ANGIOGRAM INDICATION: Left-sided chest pain  COMPARISON: None  TECHNIQUE: CTA examination of the chest was performed using angiographic technique according to a protocol specifically tailored to evaluate for pulmonary embolism  Reformatted images were created in axial, sagittal, and coronal planes  In addition, coronal 3D MIP postprocessing was performed on the acquisition scanner  Radiation dose length product (DLP) for this visit:  384 43 mGy-cm   This examination, like all CT scans performed in the [facility], was performed utilizing techniques to minimize radiation dose exposure, including the use of iterative  reconstruction and automated exposure control  IV Contrast:  85 mL of iohexol (OMNIPAQUE)      FINDINGS: PULMONARY ARTERIAL TREE:  No pulmonary embolus is seen  LUNGS:  No infiltrates  There is a 5 mm right lower lobe superior segment nodule on image 3/28  Tiny calcified granuloma noted in the right upper lobe  There is no tracheal or endobronchial lesion  PLEURA:  Unremarkable  HEART/AORTA:  Unremarkable for patient's age  MEDIASTINUM AND PATO:  Unremarkable  CHEST WALL AND LOWER NECK:       Normal  VISUALIZED STRUCTURES IN THE UPPER ABDOMEN:  Unremarkable  OSSEOUS STRUCTURES:  No acute fracture or destructive osseous lesion  Impression: No acute pathology  No pulmonary embolism  Right lower lobe 5 mm nodule  Based on current Fleischner Society 2017 Guidelines on incidental pulmonary nodule, in this patient who is less than 39years of age, management decisions should be made on a case by case basis, keeping in mind that infectious causes are more likely than cancer and that use of serial CT should be minimized  Workstation performed: [id number]       Incidental Findings:   · Right lung nodule    Test Results Pending at Discharge (will require follow up):   · As per After Visit Summary     Outpatient Tests Requested:  · none    Complications:  See hospital course    Hospital Course:     Nico Simpson is a 32 y o  female patient who originally presented to the hospital on 11/9/2017 due to intermittent left-sided chest pain x2 years which worsened yesterday  Troponin x1 was negative in the ER she was admitted to telemetry for further evaluation  Stress test and echocardiogram was ordered  She was also noted to have a dental infection and started on IV clindamycin    Patient is IV drug user and cigarette smoker and patient patient was advised to quit using both but patient was not interested  Patient found to have a right lower lobe nodule incidental finding on CTA chest   While on the floors patient demanded that she wanted to go home before any testing could be done  She stated that she did not want to stay for any testing  Patient understands the risks of going home including worsening of condition and death  [place]  She was given a prescription for clindamycin for her tooth infection and advised to follow up with a dentist as soon as possible    Condition at Discharge: stable     Discharge Day Visit / Exam:     Subjective:  Wants to go home  Does not want to stay for any testing    Vitals: Blood Pressure: 109/62 (11/09/17 0911)  Pulse: 72 (11/09/17 0911)  Temperature: 98 1 °F (36 7 °C) (11/09/17 0911)  Temp Source: Oral (11/09/17 0911)  Respirations: 12 (11/09/17 0911)  Height: 5' 5" (165 1 cm) (11/09/17 0418)  Weight - Scale: 53 5 kg (118 lb) (11/09/17 0418)  SpO2: 99 % (11/09/17 0911)  Exam:   Physical Exam    See physical exam from time of admission    Discharge instructions/Information to patient and family:(Discharge Medications and Follow up):   See after visit summary for information provided to patient and family  Provisions for Follow-Up Care:  See after visit summary for information related to follow-up care and any pertinent home health orders  Disposition: Left against medical advice    Planned Readmission:  No     Discharge Statement:  I spent 15 minutes discharging the patient  This time was spent on the day of discharge  I had direct contact with the patient on the day of discharge  Greater than 50% of the total time was spent examining patient, answering all patient questions, arranging and discussing plan of care with patient as well as directly providing post-discharge instructions  Additional time then spent on discharge activities      Discharge Medications:  See after visit summary for reconciled discharge medications provided to patient and family  ** Please Note: Dragon 360 Dictation voice to text software may have been used in the creation of this document   **

## 2017-11-10 LAB
ATRIAL RATE: 70 BPM
ATRIAL RATE: 87 BPM
P AXIS: 64 DEGREES
P AXIS: 72 DEGREES
PR INTERVAL: 128 MS
PR INTERVAL: 138 MS
QRS AXIS: 77 DEGREES
QRS AXIS: 82 DEGREES
QRSD INTERVAL: 82 MS
QRSD INTERVAL: 84 MS
QT INTERVAL: 364 MS
QT INTERVAL: 372 MS
QTC INTERVAL: 401 MS
QTC INTERVAL: 438 MS
T WAVE AXIS: 21 DEGREES
T WAVE AXIS: 38 DEGREES
VENTRICULAR RATE: 70 BPM
VENTRICULAR RATE: 87 BPM

## 2017-11-10 NOTE — CASE MANAGEMENT
Initial Clinical Review    Admission: Date/Time/Statement: N/A @ N/A     Orders Placed This Encounter   Procedures    Place in Observation (expected length of stay for this patient is less than two midnights)     Standing Status:   Standing     Number of Occurrences:   1     Order Specific Question:   Admitting Physician     Answer:   Ana Nowak [[id number]]     Order Specific Question:   Level of Care     Answer:   Med Surg [16]         ED: Date/Time/Mode of Arrival:   ED Arrival Information     Expected Arrival Acuity Means of Arrival Escorted By Service Admission Type    - 11/9/2017 04:12 Urgent [facility] Urgent    Arrival Complaint    CHEST PAIN          Chief Complaint:   Chief Complaint   Patient presents with    Chest Pain     patient has had intermittant left side chest pain all day, worsened when she went to lay down, describes as tightness and cramping       History of Illness:     ED Vital Signs:   ED Triage Vitals [11/09/17 0418]   Temperature Pulse Respirations Blood Pressure SpO2   97 5 °F (36 4 °C) 85 20 152/79 100 %      Temp Source Heart Rate Source Patient Position - Orthostatic VS BP Location FiO2 (%)   Tympanic Monitor Sitting Right arm --      Pain Score       No Pain        Wt Readings from Last 1 Encounters:   11/09/17 53 5 kg (118 lb)       Vital Signs (abnormal):      Abnormal Labs/Diagnostic Test Results:     ED Treatment:   Medication Administration from 11/09/2017 0411 to 11/09/2017 0815       Date/Time Order Dose Route Action Action by Comments     11/09/2017 0626 sodium chloride 0 9 % bolus 1,000 mL 0 mL Intravenous Stopped Ana Gao RN      11/09/2017 0526 sodium chloride 0 9 % bolus 1,000 mL 1,000 mL Intravenous Augie 37 Charles Hernandez RN      11/09/2017 0527 ketorolac (TORADOL) 30 mg/mL injection 15 mg 15 mg Intravenous Given Charles Hernandez RN      11/09/2017 0706 clindamycin (CLEOCIN) IVPB (premix) 600 mg 0 mg Intravenous Stopped Ana Gao RN 11/09/2017 0636 clindamycin (CLEOCIN) IVPB (premix) 600 mg 600 mg Intravenous Augie 37 Milan Garcia RN      11/09/2017 0656 iohexol (OMNIPAQUE) 350 MG/ML injection (MULTI-DOSE) 85 mL 85 mL Intravenous Given Ayanna Linda Jimenes           Past Medical/Surgical History: Active Ambulatory Problems     Diagnosis Date Noted    No Active Ambulatory Problems     Resolved Ambulatory Problems     Diagnosis Date Noted    No Resolved Ambulatory Problems     Past Medical History:   Diagnosis Date    Drug abuse, IV     Endocarditis        Admitting Diagnosis: Dental abscess [K04 7]  Chest pain [R07 9]  History of endocarditis [Z86 79]    Age/Sex: 32 y o  female    Assessment/Plan:   Principal Problem:    Chest pain  Active Problems:    Tobacco use    IV drug user    Urinary frequency    Tooth infection        Assessment/Plan:     1  Chest pain - admit patient to telemetry  Troponin x1 has been negative  Obtain troponin x2 More while here  Consult Cardiology  Place patient on aspirin  Obtain exercise stress test and echocardiogram for further evaluation  Monitor closely for any worsening of pain  CTA chest ruled out PE      2  Urinary frequency - check U/A to r/o UTI  No fevers and no leukocytosis     3  Tooth infection - patient received a dose of IV clindamycin in the ER  Will continue with IV clindamycin while here  advised patient to set up an appointment with a dentist as soon as possible for further management of this     4  IV drug user - patient advised to stop using drugs  She denies any other drug use besides heroin  recommended outpatient rehab/counseling  Offered psychiatry consultation or meeting with Lisa Ledesma which patient refused as she is not interested in quitting at this time      5  Tobacco use - nicotine patch  Smoking cessation d/w patient     6    Right lower lobe nodule - incidental finding on CTA chest   Patient advised to get established with a PCP who can continue monitoring this if appropriate         VTE Prophylaxis: Enoxaparin (Lovenox)  / sequential compression device   Code Status: Level 1 - Full Code     Anticipated Length of Stay:  Patient will be admitted on an Observation basis with an anticipated length of stay of 1 midnights  Patient left AMA, ambulatory, before admission could be completed  All risks were explained to her, she stated understanding and signed the AMA form  Both IV'S were removed  CARDIO   Patient admitted with atypical chest pain  We need to rule out acute coronary syndrome  Patient has past medical history significant for chronic heroin use  Patient get an echo for LV function as well as evaluate valve function  Further plan as also of these tests become available  Patient was consult about quitting smoking and drug abuse  Consider substance abuse management consult  I was told patient is considering signing AMA and is talking to the nursing staff and hospitalist regarding that  Admission Orders:  Scheduled Meds:   Continuous Infusions:   No current facility-administered medications for this encounter     PRN Meds:

## 2018-03-28 ENCOUNTER — TRANSCRIBE ORDERS (OUTPATIENT)
Dept: ADMINISTRATIVE | Facility: HOSPITAL | Age: 28
End: 2018-03-28

## 2018-03-28 ENCOUNTER — HOSPITAL ENCOUNTER (OUTPATIENT)
Dept: RADIOLOGY | Facility: HOSPITAL | Age: 28
Discharge: HOME | End: 2018-03-28
Payer: COMMERCIAL

## 2018-03-28 DIAGNOSIS — R76.11 POSITIVE PPD: Primary | ICD-10-CM

## 2018-03-28 PROCEDURE — 71046 X-RAY EXAM CHEST 2 VIEWS: CPT

## 2020-04-17 ENCOUNTER — HOSPITAL ENCOUNTER (EMERGENCY)
Facility: HOSPITAL | Age: 30
Discharge: HOME | End: 2020-04-17
Attending: EMERGENCY MEDICINE | Admitting: EMERGENCY MEDICINE
Payer: COMMERCIAL

## 2020-04-17 VITALS
BODY MASS INDEX: 21.22 KG/M2 | HEIGHT: 66 IN | OXYGEN SATURATION: 99 % | HEART RATE: 84 BPM | RESPIRATION RATE: 16 BRPM | WEIGHT: 132.06 LBS | SYSTOLIC BLOOD PRESSURE: 124 MMHG | TEMPERATURE: 96.7 F | DIASTOLIC BLOOD PRESSURE: 57 MMHG

## 2020-04-17 DIAGNOSIS — Z13.9 ENCOUNTER FOR MEDICAL SCREENING EXAMINATION: Primary | ICD-10-CM

## 2020-04-17 PROCEDURE — 99281 EMR DPT VST MAYX REQ PHY/QHP: CPT | Performed by: EMERGENCY MEDICINE

## 2020-04-17 PROCEDURE — 99284 EMERGENCY DEPT VISIT MOD MDM: CPT

## 2021-06-16 ENCOUNTER — APPOINTMENT (EMERGENCY)
Dept: RADIOLOGY | Facility: HOSPITAL | Age: 31
End: 2021-06-16
Payer: COMMERCIAL

## 2021-06-16 ENCOUNTER — HOSPITAL ENCOUNTER (EMERGENCY)
Facility: HOSPITAL | Age: 31
Discharge: HOME | End: 2021-06-16
Attending: EMERGENCY MEDICINE | Admitting: EMERGENCY MEDICINE
Payer: COMMERCIAL

## 2021-06-16 VITALS
SYSTOLIC BLOOD PRESSURE: 136 MMHG | HEIGHT: 65 IN | TEMPERATURE: 98.4 F | OXYGEN SATURATION: 98 % | DIASTOLIC BLOOD PRESSURE: 63 MMHG | BODY MASS INDEX: 23.03 KG/M2 | RESPIRATION RATE: 22 BRPM | WEIGHT: 138.2 LBS | HEART RATE: 58 BPM

## 2021-06-16 DIAGNOSIS — R07.9 CHEST PAIN: Primary | ICD-10-CM

## 2021-06-16 LAB
ALBUMIN SERPL BCP-MCNC: 3.7 G/DL (ref 3.5–5)
ALP SERPL-CCNC: 65 U/L (ref 46–116)
ALT SERPL W P-5'-P-CCNC: 37 U/L (ref 12–78)
ANION GAP SERPL CALCULATED.3IONS-SCNC: 10 MMOL/L (ref 4–13)
APTT PPP: 30 SECONDS (ref 23–37)
AST SERPL W P-5'-P-CCNC: 38 U/L (ref 5–45)
BACTERIA UR QL AUTO: ABNORMAL /HPF
BASOPHILS # BLD AUTO: 0.06 THOUSANDS/ΜL (ref 0–0.1)
BASOPHILS NFR BLD AUTO: 1 % (ref 0–1)
BILIRUB SERPL-MCNC: 0.41 MG/DL (ref 0.2–1)
BILIRUB UR QL STRIP: NEGATIVE
BUN SERPL-MCNC: 7 MG/DL (ref 5–25)
CALCIUM SERPL-MCNC: 8.5 MG/DL (ref 8.3–10.1)
CHLORIDE SERPL-SCNC: 105 MMOL/L (ref 100–108)
CLARITY UR: CLEAR
CO2 SERPL-SCNC: 26 MMOL/L (ref 21–32)
COLOR UR: YELLOW
CREAT SERPL-MCNC: 0.65 MG/DL (ref 0.6–1.3)
D DIMER PPP FEU-MCNC: 0.32 UG/ML FEU
EOSINOPHIL # BLD AUTO: 0.12 THOUSAND/ΜL (ref 0–0.61)
EOSINOPHIL NFR BLD AUTO: 2 % (ref 0–6)
ERYTHROCYTE [DISTWIDTH] IN BLOOD BY AUTOMATED COUNT: 12.8 % (ref 11.6–15.1)
GFR SERPL CREATININE-BSD FRML MDRD: 118 ML/MIN/1.73SQ M
GLUCOSE SERPL-MCNC: 73 MG/DL (ref 65–140)
GLUCOSE UR STRIP-MCNC: NEGATIVE MG/DL
HCT VFR BLD AUTO: 31.5 % (ref 34.8–46.1)
HGB BLD-MCNC: 10.5 G/DL (ref 11.5–15.4)
HGB UR QL STRIP.AUTO: ABNORMAL
IMM GRANULOCYTES # BLD AUTO: 0.01 THOUSAND/UL (ref 0–0.2)
IMM GRANULOCYTES NFR BLD AUTO: 0 % (ref 0–2)
INR PPP: 1.17 (ref 0.84–1.19)
KETONES UR STRIP-MCNC: NEGATIVE MG/DL
LEUKOCYTE ESTERASE UR QL STRIP: NEGATIVE
LYMPHOCYTES # BLD AUTO: 3.96 THOUSANDS/ΜL (ref 0.6–4.47)
LYMPHOCYTES NFR BLD AUTO: 51 % (ref 14–44)
MCH RBC QN AUTO: 30.9 PG (ref 26.8–34.3)
MCHC RBC AUTO-ENTMCNC: 33.3 G/DL (ref 31.4–37.4)
MCV RBC AUTO: 93 FL (ref 82–98)
MONOCYTES # BLD AUTO: 0.49 THOUSAND/ΜL (ref 0.17–1.22)
MONOCYTES NFR BLD AUTO: 6 % (ref 4–12)
NEUTROPHILS # BLD AUTO: 3.08 THOUSANDS/ΜL (ref 1.85–7.62)
NEUTS SEG NFR BLD AUTO: 40 % (ref 43–75)
NITRITE UR QL STRIP: NEGATIVE
NON-SQ EPI CELLS URNS QL MICRO: ABNORMAL /HPF
NRBC BLD AUTO-RTO: 0 /100 WBCS
PH UR STRIP.AUTO: 6 [PH]
PLATELET # BLD AUTO: 235 THOUSANDS/UL (ref 149–390)
PMV BLD AUTO: 11.7 FL (ref 8.9–12.7)
POTASSIUM SERPL-SCNC: 3.5 MMOL/L (ref 3.5–5.3)
PROT SERPL-MCNC: 7.7 G/DL (ref 6.4–8.2)
PROT UR STRIP-MCNC: NEGATIVE MG/DL
PROTHROMBIN TIME: 14.8 SECONDS (ref 11.6–14.5)
RBC # BLD AUTO: 3.4 MILLION/UL (ref 3.81–5.12)
RBC #/AREA URNS AUTO: ABNORMAL /HPF
SODIUM SERPL-SCNC: 141 MMOL/L (ref 136–145)
SP GR UR STRIP.AUTO: <=1.005 (ref 1–1.03)
TROPONIN I SERPL-MCNC: <0.02 NG/ML
TROPONIN I SERPL-MCNC: <0.02 NG/ML
UROBILINOGEN UR QL STRIP.AUTO: 0.2 E.U./DL
WBC # BLD AUTO: 7.72 THOUSAND/UL (ref 4.31–10.16)
WBC #/AREA URNS AUTO: ABNORMAL /HPF

## 2021-06-16 PROCEDURE — 85379 FIBRIN DEGRADATION QUANT: CPT | Performed by: EMERGENCY MEDICINE

## 2021-06-16 PROCEDURE — 85730 THROMBOPLASTIN TIME PARTIAL: CPT | Performed by: EMERGENCY MEDICINE

## 2021-06-16 PROCEDURE — 81001 URINALYSIS AUTO W/SCOPE: CPT | Performed by: EMERGENCY MEDICINE

## 2021-06-16 PROCEDURE — 85610 PROTHROMBIN TIME: CPT | Performed by: EMERGENCY MEDICINE

## 2021-06-16 PROCEDURE — 85025 COMPLETE CBC W/AUTO DIFF WBC: CPT | Performed by: EMERGENCY MEDICINE

## 2021-06-16 PROCEDURE — 84484 ASSAY OF TROPONIN QUANT: CPT | Performed by: EMERGENCY MEDICINE

## 2021-06-16 PROCEDURE — 93005 ELECTROCARDIOGRAM TRACING: CPT

## 2021-06-16 PROCEDURE — 99285 EMERGENCY DEPT VISIT HI MDM: CPT | Performed by: EMERGENCY MEDICINE

## 2021-06-16 PROCEDURE — 36415 COLL VENOUS BLD VENIPUNCTURE: CPT | Performed by: EMERGENCY MEDICINE

## 2021-06-16 PROCEDURE — 80053 COMPREHEN METABOLIC PANEL: CPT | Performed by: EMERGENCY MEDICINE

## 2021-06-16 PROCEDURE — 99285 EMERGENCY DEPT VISIT HI MDM: CPT

## 2021-06-16 PROCEDURE — 71045 X-RAY EXAM CHEST 1 VIEW: CPT

## 2021-06-16 NOTE — ED PROVIDER NOTES
History  Chief Complaint   Patient presents with    Chest Pain     pt c/o chest pain x 3 days  states she has a history of endocarditis but does not see a cardiologist       Patient has been having pain which she describes as a heaviness in the center of her chest which has been coming and going for 3 days  Pain is not associated with fever cough or shortness of breath  There is no nausea vomiting  Pain is not positional   Patient states that she has a history of endocarditis from intravenous drug abuse          Prior to Admission Medications   Prescriptions Last Dose Informant Patient Reported? Taking? albuterol (PROVENTIL HFA,VENTOLIN HFA) 90 mcg/act inhaler Unknown at Unknown time  No No   Sig: Inhale 1-2 puffs every 6 (six) hours as needed for wheezing      Facility-Administered Medications: None       Past Medical History:   Diagnosis Date    [facility]     Endocarditis        Past Surgical History:   Procedure Laterality Date    TONSILLECTOMY         History reviewed  No pertinent family history  I have reviewed and agree with the history as documented  E-Cigarette/Vaping    E-Cigarette Use Current Every Day User      E-Cigarette/Vaping Substances    Nicotine Yes      Social History     Tobacco Use    Smoking status: Current Some Day Smoker     Packs/day: 0 50    Smokeless tobacco: Never Used   Vaping Use    Vaping Use: Every day    Substances: Nicotine   Substance Use Topics    Alcohol use: Yes     Comment: occasional    Drug use: Yes     Types: Heroin     Comment: last use of heroin today, 4 bags used this am       Review of Systems   Constitutional: Negative for chills and fever  HENT: Negative for congestion and sore throat  Eyes: Negative for visual disturbance  Respiratory: Negative for cough and shortness of breath  Cardiovascular: Positive for chest pain  Negative for palpitations and leg swelling  Gastrointestinal: Negative for abdominal pain and vomiting  Genitourinary: Negative for dysuria  Musculoskeletal: Negative for arthralgias and back pain  Skin: Negative for rash  Neurological: Negative for headaches  Hematological: Does not bruise/bleed easily  All other systems reviewed and are negative  Physical Exam  Physical Exam  Vitals and nursing note reviewed  Constitutional:       Appearance: She is well-developed  HENT:      Head: Normocephalic  Eyes:      Pupils: Pupils are equal, round, and reactive to light  Cardiovascular:      Rate and Rhythm: Normal rate and regular rhythm  Heart sounds: Normal heart sounds  Pulmonary:      Effort: Pulmonary effort is normal       Breath sounds: Normal breath sounds  Chest:      Chest wall: No tenderness  Abdominal:      Palpations: Abdomen is soft  Tenderness: There is no abdominal tenderness  Musculoskeletal:         General: Normal range of motion  Cervical back: Normal range of motion and neck supple  Right lower leg: No tenderness  No edema  Left lower leg: No tenderness  No edema  Skin:     General: Skin is dry  Capillary Refill: Capillary refill takes less than 2 seconds  Neurological:      General: No focal deficit present  Mental Status: She is alert     Psychiatric:         Mood and Affect: Mood normal          Behavior: Behavior normal          Vital Signs  ED Triage Vitals [06/16/21 0844]   Temperature Pulse Respirations Blood Pressure SpO2   98 4 °F (36 9 °C) 78 20 150/78 100 %      Temp Source Heart Rate Source Patient Position - Orthostatic VS BP Location FiO2 (%)   Tympanic Monitor Sitting Right arm --      Pain Score       8           Vitals:    06/16/21 1300 06/16/21 1315 06/16/21 1330 06/16/21 1345   BP: 112/70 118/69 134/58 125/61   Pulse: 56 (!) 50 60 64   Patient Position - Orthostatic VS: Sitting Sitting Sitting Sitting         Visual Acuity      ED Medications  Medications - No data to display    Diagnostic Studies  Results Reviewed Procedure Component Value Units Date/Time    Troponin I repeat in 3hrs [[id number]]  (Normal) Collected: 06/16/21 1326    Lab Status: Final result Specimen: Blood from Arm, Right Updated: 06/16/21 1349     Troponin I <0 02 ng/mL     Urine Microscopic [[id number]]  (Abnormal) Collected: 06/16/21 1014    Lab Status: Final result Specimen: Urine, Clean Catch Updated: 06/16/21 1040     RBC, UA 4-10 /hpf      WBC, UA 0-1 /hpf      Epithelial Cells Occasional /hpf      Bacteria, UA Occasional /hpf     Troponin I [[id number]]  (Normal) Collected: 06/16/21 1002    Lab Status: Final result Specimen: Blood from Arm, Right Updated: 06/16/21 1032     Troponin I <0 02 ng/mL     Comprehensive metabolic panel [[id number]] Collected: 06/16/21 1002    Lab Status: Final result Specimen: Blood from Arm, Right Updated: 06/16/21 1025     Sodium 141 mmol/L      Potassium 3 5 mmol/L      Chloride 105 mmol/L      CO2 26 mmol/L      ANION GAP 10 mmol/L      BUN 7 mg/dL      Creatinine 0 65 mg/dL      Glucose 73 mg/dL      Calcium 8 5 mg/dL      AST 38 U/L      ALT 37 U/L      Alkaline Phosphatase 65 U/L      Total Protein 7 7 g/dL      Albumin 3 7 g/dL      Total Bilirubin 0 41 mg/dL      eGFR 118 ml/min/1 73sq m     Narrative:      Flip guidelines for Chronic Kidney Disease (CKD):     Stage 1 with normal or high GFR (GFR > 90 mL/min/1 73 square meters)    Stage 2 Mild CKD (GFR = 60-89 mL/min/1 73 square meters)    Stage 3A Moderate CKD (GFR = 45-59 mL/min/1 73 square meters)    Stage 3B Moderate CKD (GFR = 30-44 mL/min/1 73 square meters)    Stage 4 Severe CKD (GFR = 15-29 mL/min/1 73 square meters)    Stage 5 End Stage CKD (GFR <15 mL/min/1 73 square meters)  Note: GFR calculation is accurate only with a steady state creatinine    UA (URINE) with reflex to Scope [[id number]]  (Abnormal) Collected: 06/16/21 1014    Lab Status: Final result Specimen: Urine, Clean Catch Updated: 06/16/21 1023     Color, UA Yellow     Clarity, UA Clear     Specific Gravity, UA <=1 005     pH, UA 6 0     Leukocytes, UA Negative     Nitrite, UA Negative     Protein, UA Negative mg/dl      Glucose, UA Negative mg/dl      Ketones, UA Negative mg/dl      Urobilinogen, UA 0 2 E U /dl      Bilirubin, UA Negative     Blood, UA Large    Protime-INR [[id number]]  (Abnormal) Collected: 06/16/21 1002    Lab Status: Final result Specimen: Blood from Arm, Right Updated: 06/16/21 1021     Protime 14 8 seconds      INR 1 17    APTT [[id number]]  (Normal) Collected: 06/16/21 1002    Lab Status: Final result Specimen: Blood from Arm, Right Updated: 06/16/21 1021     PTT 30 seconds     D-Dimer [[id number]]  (Normal) Collected: 06/16/21 1002    Lab Status: Final result Specimen: Blood from Arm, Right Updated: 06/16/21 1020     D-Dimer, Quant 0 32 ug/ml FEU     CBC and differential [[id number]]  (Abnormal) Collected: 06/16/21 0935    Lab Status: Final result Specimen: Blood from Arm, Left Updated: 06/16/21 0941     WBC 7 72 Thousand/uL      RBC 3 40 Million/uL      Hemoglobin 10 5 g/dL      Hematocrit 31 5 %      MCV 93 fL      MCH 30 9 pg      MCHC 33 3 g/dL      RDW 12 8 %      MPV 11 7 fL      Platelets 819 Thousands/uL      nRBC 0 /100 WBCs      Neutrophils Relative 40 %      Immat GRANS % 0 %      Lymphocytes Relative 51 %      Monocytes Relative 6 %      Eosinophils Relative 2 %      Basophils Relative 1 %      Neutrophils Absolute 3 08 Thousands/µL      Immature Grans Absolute 0 01 Thousand/uL      Lymphocytes Absolute 3 96 Thousands/µL      Monocytes Absolute 0 49 Thousand/µL      Eosinophils Absolute 0 12 Thousand/µL      Basophils Absolute 0 06 Thousands/µL                  XR chest 1 view portable    (Results Pending)              Procedures  ECG 12 Lead Documentation Only    Date/Time: 6/16/2021 8:51 AM  Performed by: Ann Dubin, MD  Authorized by: Ann Dubin, MD     Indications / Diagnosis:  Chest pain  ECG reviewed by me, the ED Provider: yes    Patient location:  ED  Interpretation:     Interpretation: abnormal    Rate:     ECG rate:  75    ECG rate assessment: normal    Rhythm:     Rhythm: sinus rhythm    Ectopy:     Ectopy: none    QRS:     QRS axis:  Normal    QRS intervals:  Normal  Conduction:     Conduction: normal    ST segments:     ST segments:  Normal  T waves:     T waves: inverted      Inverted:  V3             ED Course             HEART Risk Score      Most Recent Value   Heart Score Risk Calculator   History  0 Filed at: 06/16/2021 1352   ECG  0 Filed at: 06/16/2021 1352   Age  0 Filed at: 06/16/2021 1352   Risk Factors  1 Filed at: 06/16/2021 1352   Troponin  0 Filed at: 06/16/2021 1352   HEART Score  1 Filed at: 06/16/2021 1352                      SBIRT 22yo+      Most Recent Value   SBIRT (23 yo +)   In order to provide better care to our patients, we are screening all of our patients for alcohol and drug use  Would it be okay to ask you these screening questions? No Filed at: 06/16/2021 0907                    [facility]  Number of Diagnoses or Management Options  Diagnosis management comments: Patient has relative risk appears low  Will check cardiac profile      Disposition  Final diagnoses:   Chest pain     Time reflects when diagnosis was documented in both MDM as applicable and the Disposition within this note     Time User Action Codes Description Comment    6/16/2021  1:52 PM Judith Padron Add [R07 9] Chest pain       ED Disposition     ED Disposition Condition Date/Time Comment    Discharge Stable Wed Jun 16, 2021  1:52 PM Dante Barbosa discharge to home/self care              Follow-up Information     Follow up With Specialties Details Why MD [address]  [place]  [phone]      Carlos Viramontes, DO Cardiology Schedule an appointment as soon as possible for a visit   [employer] [facility], [address]  [address] [facility]  [phone] Patient's Medications   Discharge Prescriptions    No medications on file     No discharge procedures on file      PDMP Review     None          ED Provider  Electronically Signed by           Margo Colon MD  06/16/21 6580

## 2021-06-17 ENCOUNTER — CONSULT (OUTPATIENT)
Dept: CARDIOLOGY CLINIC | Facility: CLINIC | Age: 31
End: 2021-06-17
Payer: COMMERCIAL

## 2021-06-17 VITALS
OXYGEN SATURATION: 98 % | HEIGHT: 65 IN | BODY MASS INDEX: 22.99 KG/M2 | SYSTOLIC BLOOD PRESSURE: 120 MMHG | WEIGHT: 138 LBS | TEMPERATURE: 98.3 F | HEART RATE: 66 BPM | DIASTOLIC BLOOD PRESSURE: 88 MMHG

## 2021-06-17 DIAGNOSIS — R07.9 CHEST PAIN, UNSPECIFIED TYPE: ICD-10-CM

## 2021-06-17 DIAGNOSIS — F19.90 IV DRUG USER: ICD-10-CM

## 2021-06-17 DIAGNOSIS — Z86.79 HISTORY OF ENDOCARDITIS: ICD-10-CM

## 2021-06-17 DIAGNOSIS — R00.2 PALPITATION: ICD-10-CM

## 2021-06-17 DIAGNOSIS — Z72.0 TOBACCO USE: ICD-10-CM

## 2021-06-17 PROCEDURE — 99244 OFF/OP CNSLTJ NEW/EST MOD 40: CPT | Performed by: INTERNAL MEDICINE

## 2021-06-17 NOTE — PROGRESS NOTES
Consultation - Cardiology Office  [facility]  Deny Vázquez 26 y o  female MRN: [id number]  : 1990  Unit/Bed#:  Encounter: [id number]      Assessment:     1  Chest pain, unspecified type    2  Palpitation    3  Tobacco use    4  IV drug user    5  History of endocarditis        Discussion summary and Plan:     1  Atypical chest pain  Patient is having episodes of atypical chest pain  She is very concerned about it as she has previous history of endocarditis details of which are not available  Unfortunately she still continues to use IV  Heroin when and she is using cocaine  Pathophysiology of coronary artery disease discussed with her and her boyfriend  Encouraged her to stop using IV drug abuse as well as cocaine use and will schedule her for exercise stress test and echo Doppler for LV function and valvular disease  2  Palpitations  Be due to drug abuse  Will check Holter monitor  Lifestyle modification strongly recommended     3  History of tobacco abuse  As per patient she rarely smokes now much mostly weights  Again encouraged her to quit smoking  4  Active IV drug abuse  Patient is still actively uses IV drug multiple times in the month  She understand the consequences of it  At this time she is not interested doing rehab  Encouraged her to use clean syringe if she has to use it  Also encouraged her to seek rehab  Issues related to endocarditis, leading to even death discussed with her  5  History of endocarditis drug abuse as above     All those issues discussed with patient patient's boyfriend at length  Encouraged her to stop doing drugs  Further plan disease test become available  Thank you for your consultation  If you have any question please call me at [phone]    Counseling :  A description of the counseling  Goals and Barriers    Patient's ability to self care: Yes  Medication side effect reviewed with patient in detail and all their questions answered to their satisfaction  Primary Care Physician Requesting Consult: Yu Lopez MD    Reason for Consult / Principal Problem:  Chest pain and palpitations  HPI :     Sunil Hui is a 26y o  year old female who was referred by emergency room for above-mentioned problem  Patient who has a long-term history of IV drug abuse of heroin and use of cocaine was in the emergency room in the last few weeks with complaints about episodes of chest pain  She describes the chest pain as sharp pain which can come while she is sitting and can last for hours  Occasionally it can be there for only few seconds  She also history of endocarditis 3 years ago as per her and she was given IV antibiotics  She says she has tried to use clean syringes for IV drug abuse and last use was about 2 days ago  She also used cocaine at that time  She came with her boyfriend  She used to smoke heavily currently she is is vaping  Her EKG done in the hospital is read as normal sinus with no significant abnormality  Her troponins were negative  She does not know much family history but family members have heart attacks in the past   She has no recent surgery she has only tonsillectomy is done she also occasionally feels palpitations  Review of Systems   Constitutional: Negative for activity change, chills, diaphoresis, fever and unexpected weight change  History of IV drug abuse as well as cocaine use  Currently   HENT: Negative for congestion  Eyes: Negative for discharge and redness  Respiratory: Negative for cough, chest tightness, shortness of breath and wheezing  Cardiovascular: Positive for chest pain and palpitations  Negative for leg swelling  Gastrointestinal: Negative for abdominal pain, diarrhea and nausea  Endocrine: Negative  Genitourinary: Negative for decreased urine volume and urgency  Musculoskeletal: Negative    Negative for arthralgias, back pain and gait problem  Skin: Negative for rash and wound  Allergic/Immunologic: Negative  Neurological: Negative for dizziness, seizures, syncope, weakness, light-headedness and headaches  Hematological: Negative  Psychiatric/Behavioral: Negative for agitation and confusion  The patient is nervous/anxious  Historical Information   Past Medical History:   Diagnosis Date    Drug abuse, IV ([address] )     Endocarditis      Past Surgical History:   Procedure Laterality Date    TONSILLECTOMY       Social History     Substance and Sexual Activity   Alcohol Use Yes    Comment: occasional     Social History     Substance and Sexual Activity   Drug Use Yes    Types: Heroin    Comment: last dose this morning  Social History     Tobacco Use   Smoking Status Former Smoker    Packs/day: 0 50   Smokeless Tobacco Never Used   Tobacco Comment    not using cigarettes     Family History:   Family History   Problem Relation Age of Onset    Heart attack Mother        Meds/Allergies     No Known Allergies    Current Outpatient Medications:     albuterol (PROVENTIL HFA,VENTOLIN HFA) 90 mcg/act inhaler, Inhale 1-2 puffs every 6 (six) hours as needed for wheezing, Disp: 1 Inhaler, Rfl: 0    Vitals: Blood pressure 120/88, pulse 66, temperature 98 3 °F (36 8 °C), height 5' 5" (1 651 m), weight 62 6 kg (138 lb), last menstrual period 06/16/2021, SpO2 98 %, not currently breastfeeding  Body mass index is 22 96 kg/m²    Wt Readings from Last 3 Encounters:   06/17/21 62 6 kg (138 lb)   06/16/21 62 7 kg (138 lb 3 2 oz)   04/17/20 59 9 kg (132 lb 0 9 oz)     Vitals:    06/17/21 1012   Weight: 62 6 kg (138 lb)     BP Readings from Last 3 Encounters:   06/17/21 120/88   06/16/21 136/63   04/17/20 124/57         Physical Exam    Neurologic:  Alert & oriented x 3, no new focal deficits, Not in any acute distress, anxious  Constitutional:  Well developed, well nourished, non-toxic appearance   Eyes:  Pupil equal and reacting to light, conjunctiva normal, No JVP, No LNP   HENT:  Atraumatic, oropharynx moist, Neck- normal range of motion, no tenderness,  Neck supple   Respiratory:  Bilateral air entry, mostly clear to auscultation  Cardiovascular: S1-S2 regular with a 2/6 ejection systolic murmur and S4 is present  GI:  Soft, nondistended, normal bowel sounds, nontender, no hepatosplenomegaly appreciated  Musculoskeletal:  No edema, no tenderness, no deformities  Skin:  Well hydrated, no rash  Needle marks noted  Lymphatic:  No lymphadenopathy noted   Extremities:  No edema and distal pulses are present    Diagnostic Studies Review Cardio:  None recent    EKG:  Lead EKG done in the hospital shows no significant abnormality      Lab Review   Lab Results   Component Value Date    WBC 7 72 06/16/2021    HGB 10 5 (L) 06/16/2021    HCT 31 5 (L) 06/16/2021    MCV 93 06/16/2021    RDW 12 8 06/16/2021     06/16/2021     BMP:  Lab Results   Component Value Date    SODIUM 141 06/16/2021    K 3 5 06/16/2021     06/16/2021    CO2 26 06/16/2021    BUN 7 06/16/2021    CREATININE 0 65 06/16/2021    GLUC 73 06/16/2021    CALCIUM 8 5 06/16/2021    EGFR 118 06/16/2021     LFT:  Lab Results   Component Value Date    AST 38 06/16/2021    ALT 37 06/16/2021    ALKPHOS 65 06/16/2021    TP 7 7 06/16/2021    ALB 3 7 06/16/2021      Lipid Profile:   Lab Results   Component Value Date    CHOLESTEROL 132 11/09/2017    HDL 30 (L) 11/09/2017    LDLCALC 73 11/09/2017    TRIG 143 11/09/2017     Lab Results   Component Value Date    CHOLESTEROL 132 11/09/2017     Lab Results   Component Value Date    TROPONINI <0 02 06/16/2021     Lab Results   Component Value Date    NTBNP 51 06/07/2017      Recent Results (from the past 672 hour(s))   CBC and differential    Collection Time: 06/16/21  9:35 AM   Result Value Ref Range    WBC 7 72 4 31 - 10 16 Thousand/uL    RBC 3 40 (L) 3 81 - 5 12 Million/uL    Hemoglobin 10 5 (L) 11 5 - 15 4 g/dL    Hematocrit 31 5 (L) 34 8 - 46 1 %    MCV 93 82 - 98 fL    MCH 30 9 26 8 - 34 3 pg    MCHC 33 3 31 4 - 37 4 g/dL    RDW 12 8 11 6 - 15 1 %    MPV 11 7 8 9 - 12 7 fL    Platelets 271 012 - 912 Thousands/uL    nRBC 0 /100 WBCs    Neutrophils Relative 40 (L) 43 - 75 %    Immat GRANS % 0 0 - 2 %    Lymphocytes Relative 51 (H) 14 - 44 %    Monocytes Relative 6 4 - 12 %    Eosinophils Relative 2 0 - 6 %    Basophils Relative 1 0 - 1 %    Neutrophils Absolute 3 08 1 85 - 7 62 Thousands/µL    Immature Grans Absolute 0 01 0 00 - 0 20 Thousand/uL    Lymphocytes Absolute 3 96 0 60 - 4 47 Thousands/µL    Monocytes Absolute 0 49 0 17 - 1 22 Thousand/µL    Eosinophils Absolute 0 12 0 00 - 0 61 Thousand/µL    Basophils Absolute 0 06 0 00 - 0 10 Thousands/µL   Protime-INR    Collection Time: 06/16/21 10:02 AM   Result Value Ref Range    Protime 14 8 (H) 11 6 - 14 5 seconds    INR 1 17 0 84 - 1 19   APTT    Collection Time: 06/16/21 10:02 AM   Result Value Ref Range    PTT 30 23 - 37 seconds   Comprehensive metabolic panel    Collection Time: 06/16/21 10:02 AM   Result Value Ref Range    Sodium 141 136 - 145 mmol/L    Potassium 3 5 3 5 - 5 3 mmol/L    Chloride 105 100 - 108 mmol/L    CO2 26 21 - 32 mmol/L    ANION GAP 10 4 - 13 mmol/L    BUN 7 5 - 25 mg/dL    Creatinine 0 65 0 60 - 1 30 mg/dL    Glucose 73 65 - 140 mg/dL    Calcium 8 5 8 3 - 10 1 mg/dL    AST 38 5 - 45 U/L    ALT 37 12 - 78 U/L    Alkaline Phosphatase 65 46 - 116 U/L    Total Protein 7 7 6 4 - 8 2 g/dL    Albumin 3 7 3 5 - 5 0 g/dL    Total Bilirubin 0 41 0 20 - 1 00 mg/dL    eGFR 118 ml/min/1 73sq m   Troponin I    Collection Time: 06/16/21 10:02 AM   Result Value Ref Range    Troponin I <0 02 <=0 04 ng/mL   D-Dimer    Collection Time: 06/16/21 10:02 AM   Result Value Ref Range    D-Dimer, Quant 0 32 <0 50 ug/ml FEU   UA (URINE) with reflex to Scope    Collection Time: 06/16/21 10:14 AM   Result Value Ref Range    Color, UA Yellow     Clarity, UA Clear     Specific Gravity, UA <=1 005 1 000 - 1 030    pH, UA 6 0 5 0, 5 5, 6 0, 6 5, 7 0, 7 5, 8 0, 8 5, 9 0    Leukocytes, UA Negative Negative    Nitrite, UA Negative Negative    Protein, UA Negative Negative mg/dl    Glucose, UA Negative Negative mg/dl    Ketones, UA Negative Negative mg/dl    Urobilinogen, UA 0 2 0 2, 1 0 E U /dl E U /dl    Bilirubin, UA Negative Negative    Blood, UA Large (A) Negative   Urine Microscopic    Collection Time: 06/16/21 10:14 AM   Result Value Ref Range    RBC, UA 4-10 (A) None Seen, 0-1, 1-2, 2-4, 0-5 /hpf    WBC, UA 0-1 None Seen, 0-1, 1-2, 0-5, 2-4 /hpf    Epithelial Cells Occasional None Seen, Occasional /hpf    Bacteria, UA Occasional None Seen, Occasional /hpf   Troponin I repeat in 3hrs    Collection Time: 06/16/21  1:26 PM   Result Value Ref Range    Troponin I <0 02 <=0 04 ng/mL           Dr Danish Guallpa MD [facility]      "This note has been constructed using a voice recognition system  Therefore there may be syntax, spelling, and/or grammatical errors   Please call if you have any questions  "

## 2021-06-18 LAB
ATRIAL RATE: 75 BPM
P AXIS: 70 DEGREES
PR INTERVAL: 126 MS
QRS AXIS: 71 DEGREES
QRSD INTERVAL: 82 MS
QT INTERVAL: 390 MS
QTC INTERVAL: 435 MS
T WAVE AXIS: 17 DEGREES
VENTRICULAR RATE: 75 BPM

## 2021-06-18 PROCEDURE — 93010 ELECTROCARDIOGRAM REPORT: CPT | Performed by: INTERNAL MEDICINE

## 2021-09-15 ENCOUNTER — NURSE TRIAGE (OUTPATIENT)
Dept: OTHER | Facility: OTHER | Age: 31
End: 2021-09-15

## 2021-09-16 ENCOUNTER — APPOINTMENT (OUTPATIENT)
Dept: RADIOLOGY | Facility: CLINIC | Age: 31
End: 2021-09-16
Payer: COMMERCIAL

## 2021-09-16 ENCOUNTER — OFFICE VISIT (OUTPATIENT)
Dept: URGENT CARE | Facility: CLINIC | Age: 31
End: 2021-09-16
Payer: COMMERCIAL

## 2021-09-16 ENCOUNTER — APPOINTMENT (EMERGENCY)
Dept: RADIOLOGY | Facility: HOSPITAL | Age: 31
End: 2021-09-16
Payer: COMMERCIAL

## 2021-09-16 ENCOUNTER — HOSPITAL ENCOUNTER (EMERGENCY)
Facility: HOSPITAL | Age: 31
Discharge: HOME | End: 2021-09-16
Attending: EMERGENCY MEDICINE
Payer: COMMERCIAL

## 2021-09-16 VITALS
DIASTOLIC BLOOD PRESSURE: 73 MMHG | HEART RATE: 65 BPM | TEMPERATURE: 98.3 F | RESPIRATION RATE: 18 BRPM | SYSTOLIC BLOOD PRESSURE: 120 MMHG | OXYGEN SATURATION: 98 %

## 2021-09-16 VITALS
DIASTOLIC BLOOD PRESSURE: 68 MMHG | BODY MASS INDEX: 21.86 KG/M2 | HEART RATE: 64 BPM | WEIGHT: 136 LBS | SYSTOLIC BLOOD PRESSURE: 120 MMHG | RESPIRATION RATE: 18 BRPM | OXYGEN SATURATION: 97 % | HEIGHT: 66 IN | TEMPERATURE: 98.5 F

## 2021-09-16 DIAGNOSIS — F19.10 IV DRUG ABUSE (HCC): ICD-10-CM

## 2021-09-16 DIAGNOSIS — M79.5 FOREIGN BODY (FB) IN SOFT TISSUE: Primary | ICD-10-CM

## 2021-09-16 DIAGNOSIS — M25.571 ACUTE RIGHT ANKLE PAIN: ICD-10-CM

## 2021-09-16 PROCEDURE — 90715 TDAP VACCINE 7 YRS/> IM: CPT | Performed by: PHYSICIAN ASSISTANT

## 2021-09-16 PROCEDURE — 99284 EMERGENCY DEPT VISIT MOD MDM: CPT | Performed by: PHYSICIAN ASSISTANT

## 2021-09-16 PROCEDURE — 20520 RMVL FB MUSC/TDN SIMPLE: CPT | Performed by: PHYSICIAN ASSISTANT

## 2021-09-16 PROCEDURE — 90471 IMMUNIZATION ADMIN: CPT

## 2021-09-16 PROCEDURE — 12002 RPR S/N/AX/GEN/TRNK2.6-7.5CM: CPT | Performed by: PHYSICIAN ASSISTANT

## 2021-09-16 PROCEDURE — 99283 EMERGENCY DEPT VISIT LOW MDM: CPT

## 2021-09-16 PROCEDURE — 99213 OFFICE O/P EST LOW 20 MIN: CPT | Performed by: PHYSICIAN ASSISTANT

## 2021-09-16 PROCEDURE — 73610 X-RAY EXAM OF ANKLE: CPT

## 2021-09-16 RX ORDER — METHADONE HYDROCHLORIDE 10 MG/5ML
60 SOLUTION ORAL DAILY
COMMUNITY

## 2021-09-16 RX ORDER — SULFAMETHOXAZOLE AND TRIMETHOPRIM 800; 160 MG/1; MG/1
1 TABLET ORAL ONCE
Status: COMPLETED | OUTPATIENT
Start: 2021-09-16 | End: 2021-09-16

## 2021-09-16 RX ORDER — SULFAMETHOXAZOLE AND TRIMETHOPRIM 800; 160 MG/1; MG/1
1 TABLET ORAL EVERY 12 HOURS SCHEDULED
Qty: 10 TABLET | Refills: 0 | Status: SHIPPED | OUTPATIENT
Start: 2021-09-16 | End: 2021-09-21

## 2021-09-16 RX ORDER — SULFAMETHOXAZOLE AND TRIMETHOPRIM 800; 160 MG/1; MG/1
1 TABLET ORAL 2 TIMES DAILY
Qty: 14 TABLET | Refills: 0 | Status: SHIPPED | OUTPATIENT
Start: 2021-09-16 | End: 2021-09-23

## 2021-09-16 RX ORDER — GINSENG 100 MG
1 CAPSULE ORAL ONCE
Status: COMPLETED | OUTPATIENT
Start: 2021-09-16 | End: 2021-09-16

## 2021-09-16 RX ORDER — LIDOCAINE HYDROCHLORIDE AND EPINEPHRINE 10; 10 MG/ML; UG/ML
1 INJECTION, SOLUTION INFILTRATION; PERINEURAL ONCE
Status: COMPLETED | OUTPATIENT
Start: 2021-09-16 | End: 2021-09-16

## 2021-09-16 RX ADMIN — TETANUS TOXOID, REDUCED DIPHTHERIA TOXOID AND ACELLULAR PERTUSSIS VACCINE, ADSORBED 0.5 ML: 5; 2.5; 8; 8; 2.5 SUSPENSION INTRAMUSCULAR at 13:19

## 2021-09-16 RX ADMIN — BACITRACIN ZINC 1 LARGE APPLICATION: 500 OINTMENT TOPICAL at 13:47

## 2021-09-16 RX ADMIN — LIDOCAINE HYDROCHLORIDE AND EPINEPHRINE 1 ML: 10; 10 INJECTION, SOLUTION INFILTRATION; PERINEURAL at 17:21

## 2021-09-16 RX ADMIN — SULFAMETHOXAZOLE AND TRIMETHOPRIM 1 TABLET: 800; 160 TABLET ORAL at 13:18

## 2021-09-16 NOTE — TELEPHONE ENCOUNTER
Reason for Disposition   Deeply embedded FB  (e g , needle or toothpick in foot)    Answer Assessment - Initial Assessment Questions  1  MECHANISM: "How did it happen?"       "I do heroin and the needle came out of the plastic and it's stuck under my skin right above my ankle  I tried to push it out but it's not working "  2  LOCATION: "Where is the FB (e g , splinter) located?"       As noted above  3  OBJECT: "What type of FB was it?"       needle  4  DEPTH: "How deep do you think the FB goes?"       Unsure, but pretty deep  5  ONSET: "When did the injury occur?" (Minutes or hours)       10:45pm  6  PAIN: "Is it painful?" If Yes, ask: "How bad is the pain?"  (Scale 1-10; or mild, moderate, severe)      Denies  7  TETANUS: "When was the last tetanus booster?"      unsure    8   PREGNANCY: "Is there any chance you are pregnant?" "When was your last menstrual period?"      Denies    Protocols used: SKIN FOREIGN BODY-ADULT-
Regarding: needle in leg   ----- Message from [facility] sent at 9/15/2021 10:52 PM EDT -----  "my girlfriend has a needle stuck in her leg, im not sure if its in the vein or not, trying to figure out if I should take her to the emergency room, her doctor is dr Sierra Fernandez and dr Kim Zavaleta "
13

## 2021-09-16 NOTE — PATIENT INSTRUCTIONS
X-ray positive for foreign body in foot  Incision made but no foreign body recovered   Steri-stripped the wound and advised going directly to the ER for further evaluation     Prescribed Bactrim for prophylaxis  Patient agreeable will go to the ER

## 2021-09-16 NOTE — ED PROVIDER NOTES
History  Chief Complaint   Patient presents with    Foot Injury     pt sent by urgent care d/t right foot ankle has broken tuberculin needle  Pt reports she was trying to inject drug to his right  inner ankle and broke the needle   33year old female presents with R ankle foreign body x1 day  She states she is an IV drug user and injected into her ankle last night however the needle broke off in her ankle  She was initially able to the feel it on exam so she was seen at urgent care, had x-rays which showed placement, had an incision and then attempt to retrieve foreign body however was unsuccessful so patient was referred to ER  Patient states that she no sensation from the numbing medication from earlier today  No noted history of MRSA  No fever or chills  No difficulty ambulating  Prior to Admission Medications   Prescriptions Last Dose Informant Patient Reported? Taking? albuterol (PROVENTIL HFA,VENTOLIN HFA) 90 mcg/act inhaler  Self No No   Sig: Inhale 1-2 puffs every 6 (six) hours as needed for wheezing   Patient not taking: Reported on 9/16/2021   methadone (DOLOPHINE) 10 MG/5ML solution 9/16/2021 at Unknown time  Yes Yes   Sig: Take 60 mg by mouth daily   sulfamethoxazole-trimethoprim (BACTRIM DS) 800-160 mg per tablet   No No   Sig: Take 1 tablet by mouth every 12 (twelve) hours for 5 days      Facility-Administered Medications Last Administration Doses Remaining   lidocaine-epinephrine (XYLOCAINE/EPINEPHRINE) 1 %-1:100,000 injection 1 mL None recorded 1          Past Medical History:   Diagnosis Date    Drug abuse, IV ([address] )     Endocarditis        Past Surgical History:   Procedure Laterality Date    TONSILLECTOMY         Family History   Problem Relation Age of Onset    Heart attack Mother      I have reviewed and agree with the history as documented      E-Cigarette/Vaping    E-Cigarette Use Current Every Day User      E-Cigarette/Vaping Substances    Nicotine Yes      Social History     Tobacco Use    Smoking status: Former Smoker     Packs/day: 0 50    Smokeless tobacco: Never Used    Tobacco comment: not using cigarettes   Vaping Use    Vaping Use: Every day    Substances: Nicotine   Substance Use Topics    Alcohol use: Yes     Comment: occasional    Drug use: Yes     Types: Heroin     Comment: last dose this morning  Review of Systems   Constitutional: Negative for chills and fever  HENT: Negative for sneezing and sore throat  Respiratory: Negative for cough and shortness of breath  Cardiovascular: Negative for chest pain, palpitations and leg swelling  Gastrointestinal: Negative for abdominal pain, constipation, diarrhea, nausea and vomiting  Musculoskeletal: Positive for myalgias  Negative for back pain, gait problem and joint swelling  Skin: Positive for wound  Negative for color change, pallor and rash  Neurological: Negative for dizziness, syncope, weakness, light-headedness, numbness and headaches  All other systems reviewed and are negative  Physical Exam  Physical Exam  Vitals and nursing note reviewed  Constitutional:       General: She is not in acute distress  Appearance: Normal appearance  She is well-developed and normal weight  She is not diaphoretic  HENT:      Head: Normocephalic and atraumatic  Nose: Nose normal    Eyes:      Extraocular Movements: Extraocular movements intact  Conjunctiva/sclera: Conjunctivae normal    Cardiovascular:      Rate and Rhythm: Normal rate and regular rhythm  Pulses: Normal pulses  Heart sounds: Normal heart sounds  No murmur heard  No friction rub  No gallop  Pulmonary:      Effort: Pulmonary effort is normal  No respiratory distress  Breath sounds: Normal breath sounds  No stridor  No wheezing or rales  Comments: Sp02 is 98% indicating adequate oxygenation on room air  Musculoskeletal:         General: Normal range of motion        Cervical back: Normal range of motion and neck supple  Legs:    Skin:     General: Skin is warm  Capillary Refill: Capillary refill takes less than 2 seconds  Coloration: Skin is not pale  Findings: No erythema or rash  Neurological:      Mental Status: She is alert  Mental status is at baseline  Vital Signs  ED Triage Vitals [09/16/21 1129]   Temperature Pulse Respirations Blood Pressure SpO2   98 3 °F (36 8 °C) 65 18 120/73 98 %      Temp Source Heart Rate Source Patient Position - Orthostatic VS BP Location FiO2 (%)   Oral Monitor Sitting Right arm --      Pain Score       --           Vitals:    09/16/21 1129   BP: 120/73   Pulse: 65   Patient Position - Orthostatic VS: Sitting         Visual Acuity      ED Medications  Medications   tetanus-diphtheria-acellular pertussis (BOOSTRIX) IM injection 0 5 mL (0 5 mL Intramuscular Given 9/16/21 1319)   sulfamethoxazole-trimethoprim (BACTRIM DS) 800-160 mg per tablet 1 tablet (1 tablet Oral Given 9/16/21 1318)   bacitracin topical ointment 1 large application (1 large application Topical Given 9/16/21 1347)       Diagnostic Studies  Results Reviewed     None                 XR ankle 3+ views RIGHT   Final Result by Analy Morocho MD (09/16 1350)      No acute osseous abnormality  Unchanged position of a 1 9 cm linear radiopaque foreign body in the medial soft tissues just proximal to the level of the medial malleolus  Workstation performed: [id number]                    Procedures  Laceration repair    Date/Time: 9/16/2021 1:38 PM  Performed by: Griselda Canela PA-C  Authorized by: Griselda Canela PA-C   Consent: Verbal consent obtained    Risks and benefits: risks, benefits and alternatives were discussed  Consent given by: patient  Patient understanding: patient states understanding of the procedure being performed  Patient consent: the patient's understanding of the procedure matches consent given  Procedure consent: procedure consent matches procedure scheduled  Relevant documents: relevant documents present and verified  Test results: test results available and properly labeled  Site marked: the operative site was marked  Radiology Images displayed and confirmed  If images not available, report reviewed: imaging studies available  Required items: required blood products, implants, devices, and special equipment available  Patient identity confirmed: verbally with patient  Body area: lower extremity  Location details: right ankle  Laceration length: 3 cm  Foreign bodies: no foreign bodies  Tendon involvement: none  Nerve involvement: none  Vascular damage: no      Procedure Details:  Preparation: Patient was prepped and draped in the usual sterile fashion  Irrigation solution: saline  Irrigation method: syringe  Amount of cleaning: extensive  Skin closure: Ethilon (4-0)  Number of sutures: 2  Technique: simple  Approximation: loose  Approximation difficulty: simple  Dressing: antibiotic ointment and pressure dressing  Patient tolerance: patient tolerated the procedure well with no immediate complications               ED Course  ED Course as of Sep 16 1546   Thu Sep 16, 2021   1213 Discussed w/xray to put markers near incision site to assess for distance from fb      1240 Will discuss with Dr Agueda Duarte in podiatry      8444 Spoke with Dr Agueda Duarte who advised to discuss with general surgery Dr Lisa Day who advised to discuss with orthopedics Dr Boone Been  Dr Boone Been advised to washout wound and close with 2 sutures, will see in office                                                 MDM  Number of Diagnoses or Management Options  Foreign body (FB) in soft tissue  Diagnosis management comments: Repeat imaging confirms placement of needle via bb marker  Sticker left in place, advised patient to myra site where bb is if sticker were to come off  Needle appears superficial however unable to palpate or locate from previous incision site, therefore will defer removal to specialist which patient was agreeable to  Wound thoroughly irrigated and closed with 2 sutures per ortho recommendation  Updated tetanus  Applied bacitracin ointment and covered  Will cover with bactrim for any infection  Given orthopedic follow up  Gave patient proper education regarding diagnosis  Answered all questions  Return to ED for any worsening of symptoms otherwise follow up with primary care physician for re-evaluation  Discussed plan with patient who verbalized understanding and agreed to plan  Amount and/or Complexity of Data Reviewed  Tests in the radiology section of CPT®: reviewed and ordered  Discussion of test results with the performing providers: yes  Review and summarize past medical records: yes  Discuss the patient with other providers: yes  Independent visualization of images, tracings, or specimens: yes        Disposition  Final diagnoses:   Foreign body (FB) in soft tissue     Time reflects when diagnosis was documented in both MDM as applicable and the Disposition within this note     Time User Action Codes Description Comment    9/16/2021  1:51 PM Gini Cabot Add [M79 5] Foreign body (FB) in soft tissue       ED Disposition     ED Disposition Condition Date/Time Comment    Discharge Stable Thu Sep 16, 2021  1:51 PM Babak Prasad discharge to home/self care              Follow-up Information     Follow up With Specialties Details Why Contact Info Additional Information    Gretel Cuellar DO Orthopedic Surgery, Hand Surgery Call today to make follow up appointment for R ankle needle fb [address] [facility] [address], [address] [facility] [address] Emergency Department Emergency Medicine Go to  As needed [address] [address] [address] [address] Emergency Department, [facility], [id number]          Discharge Medication List as of 9/16/2021 1:53 PM      START taking these medications    Details   !! sulfamethoxazole-trimethoprim (BACTRIM DS) 800-160 mg per tablet Take 1 tablet by mouth 2 (two) times a day for 7 days smx-tmp DS (BACTRIM) 800-160 mg tabs (1tab q12 D10), Starting Thu 9/16/2021, Until Thu 9/23/2021, Normal       !! - Potential duplicate medications found  Please discuss with provider  CONTINUE these medications which have NOT CHANGED    Details   methadone (DOLOPHINE) 10 MG/5ML solution Take 60 mg by mouth daily, Historical Med      albuterol (PROVENTIL HFA,VENTOLIN HFA) 90 mcg/act inhaler Inhale 1-2 puffs every 6 (six) hours as needed for wheezing, Starting Wed 7/19/2017, Print      !! sulfamethoxazole-trimethoprim (BACTRIM DS) 800-160 mg per tablet Take 1 tablet by mouth every 12 (twelve) hours for 5 days, Starting Thu 9/16/2021, Until Tue 9/21/2021, Normal       !! - Potential duplicate medications found  Please discuss with provider  No discharge procedures on file      PDMP Review     None          ED Provider  Electronically Signed by           Jet Ramirez PA-C  09/16/21 5236

## 2021-09-16 NOTE — PROGRESS NOTES
[facility]        NAME: Jacob Montanez is a 33 y o  female  : 1990    MRN: [id number]  DATE: 2021  TIME: 5:20 PM    Assessment and Plan   Foreign body (FB) in soft tissue [M79 5]  1  Foreign body (FB) in soft tissue  lidocaine-epinephrine (XYLOCAINE/EPINEPHRINE) 1 %-1:100,000 injection 1 mL    sulfamethoxazole-trimethoprim (BACTRIM DS) 800-160 mg per tablet    Transfer to other facility    Foreign body removal   2  Acute right ankle pain  XR ankle 3+ vw right   3  IV drug abuse [facility])           Patient Instructions     Patient Instructions    X-ray positive for foreign body in foot  Incision made but no foreign body recovered   Steri-stripped the wound and advised going directly to the ER for further evaluation  Prescribed Bactrim for prophylaxis  Patient agreeable will go to the ER          Follow up with PCP in 3-5 days  Proceed to  ER if symptoms worsen  Chief Complaint     Chief Complaint   Patient presents with    foriegn body in foot     needle broke off in ankle vein during IV injection last night          History of Present Illness        53-year-old female IVDA presents for a needles that is embedded in the skin of her right ankle  Pt admits she ordered a box of needles off line, she is unsure of kalyani, and shot up in her ankle yesterday but the needle broke off  She tried to remove it on her own with out success  It is sore but is not painful  No redness, warmth, swelling or bruising in the area  Pt has a hx of endocarditis 4 yrs ago  Currently she is being dosed every other day with methadone and drug tested only before dosing  Denies fever, chill, nausea, vomiting, fatigue, headache, SOB or CP  Review of Systems   Review of Systems   Constitutional: Negative for chills, fatigue and fever  Respiratory: Negative for cough, chest tightness and shortness of breath  Cardiovascular: Negative for chest pain and palpitations     Gastrointestinal: Negative for abdominal pain, diarrhea, nausea and vomiting  Musculoskeletal: Negative for arthralgias and myalgias  Skin: Positive for color change and wound  Neurological: Negative for headaches  All other systems reviewed and are negative  Current Medications       Current Outpatient Medications:     methadone (DOLOPHINE) 10 MG/5ML solution, Take 60 mg by mouth daily, Disp: , Rfl:     albuterol (PROVENTIL HFA,VENTOLIN HFA) 90 mcg/act inhaler, Inhale 1-2 puffs every 6 (six) hours as needed for wheezing (Patient not taking: Reported on 9/16/2021), Disp: 1 Inhaler, Rfl: 0    sulfamethoxazole-trimethoprim (BACTRIM DS) 800-160 mg per tablet, Take 1 tablet by mouth every 12 (twelve) hours for 5 days, Disp: 10 tablet, Rfl: 0    sulfamethoxazole-trimethoprim (BACTRIM DS) 800-160 mg per tablet, Take 1 tablet by mouth 2 (two) times a day for 7 days smx-tmp DS (BACTRIM) 800-160 mg tabs (1tab q12 D10), Disp: 14 tablet, Rfl: 0    Current Facility-Administered Medications:     lidocaine-epinephrine (XYLOCAINE/EPINEPHRINE) 1 %-1:100,000 injection 1 mL, 1 mL, Infiltration, Once, [employer]    Current Allergies     Allergies as of 09/16/2021    (No Known Allergies)            The following portions of the patient's history were reviewed and updated as appropriate: allergies, current medications, past family history, past medical history, past social history, past surgical history and problem list      Past Medical History:   Diagnosis Date    Drug abuse, IV ([address] )     Endocarditis        Past Surgical History:   Procedure Laterality Date    TONSILLECTOMY         Family History   Problem Relation Age of Onset    Heart attack Mother          Medications have been verified          Objective   /68   Pulse 64   Temp 98 5 °F (36 9 °C)   Resp 18   Ht 5' 6" (1 676 m)   Wt 61 7 kg (136 lb)   LMP 08/26/2021   SpO2 97%   BMI 21 95 kg/m²          Physical Exam     Physical Exam  Vitals and nursing note reviewed  Constitutional:       Appearance: Normal appearance  HENT:      Head: Normocephalic  Right Ear: Tympanic membrane normal       Left Ear: Tympanic membrane normal       Nose: Nose normal       Mouth/Throat:      Mouth: Mucous membranes are moist       Pharynx: No posterior oropharyngeal erythema  Eyes:      Extraocular Movements: Extraocular movements intact  Pupils: Pupils are equal, round, and reactive to light  Cardiovascular:      Rate and Rhythm: Normal rate and regular rhythm  Pulses: Normal pulses  Heart sounds: Normal heart sounds  No murmur heard  Pulmonary:      Effort: Pulmonary effort is normal  No respiratory distress  Breath sounds: Normal breath sounds  Musculoskeletal:        Feet:    Feet:      Comments: Xray confirmed thin solid foreign body right medial malleolus consistent with needle   Skin:     General: Skin is warm  Neurological:      Mental Status: She is alert and oriented to person, place, and time  Psychiatric:         Behavior: Behavior normal            Universal Protocol:  Procedure performed by: Brant Padron PA-C)  Consent: Verbal consent obtained  Consent given by: patient  Patient identity confirmed: verbally with patient    Foreign body removal    Date/Time: 9/16/2021 5:17 PM  Performed by: Zeferino Morales PA-C  Authorized by: Zeferino Morales PA-C   Body area: skin  General location: lower extremity  Location details: right ankle  Anesthesia: local infiltration    Anesthesia:  Local Anesthetic: lidocaine 1% with epinephrine  Anesthetic total: 2 mL  Localization method: visualized  Removal mechanism: forceps and scalpel  Tendon involvement: superficial  Post-procedure assessment: foreign body not removed  Comments: Xray showed confirmation of FB, placed 2mm incision to start just above medial right mal, when no FB was located extended the incision upward  Final incision about 1 5cm, tendon was visualized but not cut   No foreign body located  Applied steri-strips and referred to the ER

## 2021-09-20 ENCOUNTER — TELEPHONE (OUTPATIENT)
Dept: OBGYN CLINIC | Facility: HOSPITAL | Age: 31
End: 2021-09-20

## 2021-09-20 NOTE — TELEPHONE ENCOUNTER
Hello,    Please advise if the following patient can be forced onto the schedule:    Justo Cazares  [birth year]  MRN [id number]  # [phone]  Needle stuck in right ankle  AS per ED notes    Patient should follow up with Dr Fernando Estrada        Email sent to [facility]

## 2022-07-15 ENCOUNTER — APPOINTMENT (EMERGENCY)
Dept: RADIOLOGY | Facility: HOSPITAL | Age: 32
End: 2022-07-15
Payer: COMMERCIAL

## 2022-07-15 ENCOUNTER — HOSPITAL ENCOUNTER (EMERGENCY)
Facility: HOSPITAL | Age: 32
Discharge: HOME | End: 2022-07-15
Attending: EMERGENCY MEDICINE
Payer: COMMERCIAL

## 2022-07-15 VITALS
BODY MASS INDEX: 21.86 KG/M2 | RESPIRATION RATE: 18 BRPM | WEIGHT: 136 LBS | HEIGHT: 66 IN | DIASTOLIC BLOOD PRESSURE: 62 MMHG | HEART RATE: 77 BPM | SYSTOLIC BLOOD PRESSURE: 130 MMHG | TEMPERATURE: 97 F | OXYGEN SATURATION: 99 %
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
## 2022-07-15 DIAGNOSIS — S00.11XA: ICD-10-CM

## 2022-07-15 DIAGNOSIS — S01.111A RIGHT EYELID LACERATION, INITIAL ENCOUNTER: ICD-10-CM

## 2022-07-15 PROCEDURE — G1004 CDSM NDSC: HCPCS

## 2022-07-15 PROCEDURE — 70486 CT MAXILLOFACIAL W/O DYE: CPT

## 2022-07-15 PROCEDURE — 72125 CT NECK SPINE W/O DYE: CPT

## 2022-07-15 PROCEDURE — 99284 EMERGENCY DEPT VISIT MOD MDM: CPT | Performed by: EMERGENCY MEDICINE

## 2022-07-15 PROCEDURE — 70450 CT HEAD/BRAIN W/O DYE: CPT

## 2022-07-15 PROCEDURE — 99284 EMERGENCY DEPT VISIT MOD MDM: CPT

## 2022-07-15 PROCEDURE — 12011 RPR F/E/E/N/L/M 2.5 CM/<: CPT | Performed by: EMERGENCY MEDICINE

## 2022-07-15 RX ORDER — BACITRACIN, NEOMYCIN, POLYMYXIN B 400; 3.5; 5 [USP'U]/G; MG/G; [USP'U]/G
OINTMENT TOPICAL 2 TIMES DAILY
Qty: 15 G | Refills: 0 | Status: SHIPPED | OUTPATIENT
Start: 2022-07-15 | End: 2022-07-22

## 2022-07-15 RX ORDER — LIDOCAINE HYDROCHLORIDE 20 MG/ML
5 INJECTION, SOLUTION EPIDURAL; INFILTRATION; INTRACAUDAL; PERINEURAL ONCE
Status: COMPLETED | OUTPATIENT
Start: 2022-07-15 | End: 2022-07-15

## 2022-07-15 RX ORDER — CEPHALEXIN 500 MG/1
500 CAPSULE ORAL EVERY 8 HOURS SCHEDULED
Qty: 10 CAPSULE | Refills: 0 | Status: SHIPPED | OUTPATIENT
Start: 2022-07-15 | End: 2022-07-22

## 2022-07-15 RX ORDER — GINSENG 100 MG
1 CAPSULE ORAL ONCE
Status: COMPLETED | OUTPATIENT
Start: 2022-07-15 | End: 2022-07-15

## 2022-07-15 RX ORDER — CEPHALEXIN 500 MG/1
500 CAPSULE ORAL ONCE
Status: COMPLETED | OUTPATIENT
Start: 2022-07-15 | End: 2022-07-15

## 2022-07-15 RX ADMIN — CEPHALEXIN 500 MG: 500 CAPSULE ORAL at 09:49

## 2022-07-15 RX ADMIN — BACITRACIN ZINC 1 SMALL APPLICATION: 500 OINTMENT TOPICAL at 09:49

## 2022-07-15 RX ADMIN — LIDOCAINE HYDROCHLORIDE 5 ML: 20 INJECTION, SOLUTION EPIDURAL; INFILTRATION; INTRACAUDAL; PERINEURAL at 09:27

## 2022-07-15 NOTE — ED PROVIDER NOTES
History  Chief Complaint   Patient presents with    Facial Injury     Patient reports falling with face on table this AM, reports unknown time LOC at the time of the fall, now A&Ox4  Patient has profuse swelling and laceration to right eye  Denies nausea, vomiting, dizziness  57-year-old female with past history of heroin abuse currently on methadone therapy, endocarditis, IV drug use, presents to the ED for evaluation of right eyelid laceration after a fall  Patient states that she relapsed and used heroin last night  Patient woke up this morning and was walking in her kitchen when she stumbled into something and fell forward hitting the right side of her face against the edge of the dining table  Patient states that she may have had brief moment of LOC  Patient appears to be intoxicated in the ED  Patient is alert and oriented x3  Patient denies any other injuries  Patient has no other pain  Patient's tetanus is up-to-date  Patient has swelling over the right eyelid with a laceration  History provided by:  Patient  Facial Injury  Associated symptoms: no congestion, no ear pain, no headaches, no nausea and no wheezing        Prior to Admission Medications   Prescriptions Last Dose Informant Patient Reported? Taking? albuterol (PROVENTIL HFA,VENTOLIN HFA) 90 mcg/act inhaler  Self No No   Sig: Inhale 1-2 puffs every 6 (six) hours as needed for wheezing   Patient not taking: Reported on 9/16/2021   methadone (DOLOPHINE) 10 MG/5ML solution 7/14/2022 at Unknown time  Yes Yes   Sig: Take 60 mg by mouth daily      Facility-Administered Medications: None       Past Medical History:   Diagnosis Date    Drug abuse, IV ([facility] [address] )     Endocarditis        Past Surgical History:   Procedure Laterality Date    TONSILLECTOMY         Family History   Problem Relation Age of Onset    Heart attack Mother      I have reviewed and agree with the history as documented      E-Cigarette/Vaping    E-Cigarette Use Current Every Day User      E-Cigarette/Vaping Substances    Nicotine Yes     THC Yes     CBD No     Flavoring No     Other No     Unknown No      Social History     Tobacco Use    Smoking status: Former Smoker     Packs/day: 0 50    Smokeless tobacco: Never Used    Tobacco comment: not using cigarettes   Vaping Use    Vaping Use: Every day    Substances: Nicotine, THC   Substance Use Topics    Alcohol use: Yes     Comment: occasional    Drug use: Yes     Types: Heroin, Other, Marijuana     Comment: last heroin use 7/14 PM, THC vaper, and methadone 140mg daily       Review of Systems   Constitutional: Negative for activity change, appetite change, chills and fever  HENT: Negative for congestion and ear pain  Eyes: Negative for pain and discharge  Respiratory: Negative for cough, chest tightness, shortness of breath, wheezing and stridor  Cardiovascular: Negative for chest pain and palpitations  Gastrointestinal: Negative for abdominal distention, abdominal pain, constipation, diarrhea and nausea  Endocrine: Negative for cold intolerance  Genitourinary: Negative for dysuria, frequency and urgency  Musculoskeletal: Negative for arthralgias and back pain  Skin: Positive for wound  Negative for color change and rash  Allergic/Immunologic: Negative for environmental allergies and food allergies  Neurological: Negative for dizziness, weakness, numbness and headaches  Hematological: Negative for adenopathy  Psychiatric/Behavioral: Negative for agitation, behavioral problems and confusion  The patient is not nervous/anxious  All other systems reviewed and are negative  Physical Exam  Physical Exam  Vitals and nursing note reviewed  Constitutional:       Appearance: She is well-developed  Comments: Somnolent during interview   HENT:      Head: Normocephalic and atraumatic        Right Ear: Tympanic membrane normal       Left Ear: Tympanic membrane normal  Mouth/Throat:      Mouth: Mucous membranes are moist    Eyes:      Extraocular Movements: Extraocular movements intact  Conjunctiva/sclera: Conjunctivae normal       Pupils: Pupils are equal, round, and reactive to light  Comments: No conjunctival injection noted bilaterally  1 cm horizontal laceration noted over the right eyelid  Hematoma noted over the right eyelid  Extraocular muscles are intact bilaterally  No photophobia or blurry vision noted on exam    Cardiovascular:      Rate and Rhythm: Normal rate and regular rhythm  Heart sounds: Normal heart sounds  Pulmonary:      Effort: Pulmonary effort is normal       Breath sounds: Normal breath sounds  Abdominal:      General: Bowel sounds are normal  There is no distension  Palpations: Abdomen is soft  Tenderness: There is no abdominal tenderness  Musculoskeletal:         General: Normal range of motion  Cervical back: Normal range of motion and neck supple  Skin:     General: Skin is warm and dry  Neurological:      General: No focal deficit present  Mental Status: She is alert and oriented to person, place, and time  Comments: No focal neuro deficits noted  Psychiatric:         Mood and Affect: Mood normal          Behavior: Behavior normal          Thought Content:  Thought content normal          Judgment: Judgment normal          Vital Signs  ED Triage Vitals [07/15/22 0748]   Temperature Pulse Respirations Blood Pressure SpO2   (!) 96 8 °F (36 °C) 76 20 134/62 98 %      Temp Source Heart Rate Source Patient Position - Orthostatic VS BP Location FiO2 (%)   Tympanic Monitor Lying Right arm --      Pain Score       8           Vitals:    07/15/22 0748 07/15/22 0957   BP: 134/62 130/62   Pulse: 76 77   Patient Position - Orthostatic VS: Lying Sitting         Visual Acuity  Visual Acuity    Flowsheet Row Most Recent Value   L Pupil Size (mm) 2   R Pupil Size (mm) 2          ED Medications  Medications lidocaine (PF) (XYLOCAINE-MPF) 2 % injection 5 mL (5 mL Infiltration Given 7/15/22 0927)   cephalexin (KEFLEX) capsule 500 mg (500 mg Oral Given 7/15/22 0949)   bacitracin topical ointment 1 small application (1 small application Topical Given 7/15/22 0949)       Diagnostic Studies  Results Reviewed     None                 CT spine cervical without contrast   Final Result by Destinee Kim MD (07/15 0901)      No cervical spine fracture or traumatic malalignment  The study was marked in [facility] for immediate notification  Workstation performed: [id number]         CT head without contrast   Final Result by Destinee Kim MD (62/00 8860)         1  No acute intracranial abnormality noted  2   Right periorbital soft tissue swelling  Intraorbital contents intact  3   No acute facial fracture identified  The study was marked in [facility] for immediate notification  Workstation performed: [id number]         CT facial bones without contrast   Final Result by Destinee Kim MD (14/28 3980)         1  No acute intracranial abnormality noted  2   Right periorbital soft tissue swelling  Intraorbital contents intact  3   No acute facial fracture identified  The study was marked in [facility] for immediate notification  Workstation performed: [id number]                    Procedures  Laceration repair    Date/Time: 7/15/2022 9:40 AM  Performed by: Gael Quintero DO  Authorized by: Gael Quintero DO   Consent: Verbal consent obtained    Risks and benefits: risks, benefits and alternatives were discussed  Consent given by: patient  Patient identity confirmed: verbally with patient  Body area: head/neck  Location details: right eyelid  Laceration length: 1 cm  Foreign bodies: no foreign bodies  Tendon involvement: none  Nerve involvement: none  Anesthesia: local infiltration    Anesthesia:  Local Anesthetic: lidocaine 2% without epinephrine  Anesthetic total: 3 mL    Sedation:  Patient sedated: no      Wound Dehiscence:  Superficial Wound Dehiscence: simple closure      Procedure Details:  Preparation: Patient was prepped and draped in the usual sterile fashion  Irrigation solution: saline  Irrigation method: jet lavage  Amount of cleaning: standard  Wound skin closure material used: 5-0 Fast-absorbing plain gut  Number of sutures: 4  Approximation: close  Approximation difficulty: simple  Dressing: antibiotic ointment (band-aid)  Patient tolerance: patient tolerated the procedure well with no immediate complications               ED Course                               SBIRT 22yo+    Flowsheet Row Most Recent Value   SBIRT (23 yo +)    In order to provide better care to our patients, we are screening all of our patients for alcohol and drug use  Would it be okay to ask you these screening questions? No Filed at: 07/15/2022 0753                    MDM  Number of Diagnoses or Management Options  Accidental fall, initial encounter: new and requires workup  Right eyelid laceration, initial encounter: new and requires workup  Traumatic hematoma of right eyelid, initial encounter: new and requires workup  Diagnosis management comments: Obtain CT head, facial bones, neck  Perform laceration repair       Amount and/or Complexity of Data Reviewed  Tests in the radiology section of CPT®: ordered and reviewed  Tests in the medicine section of CPT®: ordered and reviewed  Review and summarize past medical records: yes  Independent visualization of images, tracings, or specimens: yes    Risk of Complications, Morbidity, and/or Mortality  General comments: Laceration repaired successfully at bedside  Soft tissue hematoma noted over the right eyelid without any other traumatic injuries on the CT scans  At this time patient is discharged home with p o  Empiric antibiotic as well as Neosporin and follow-up to PCP    Close return instructions given to return to the ER for any worsening symptoms  Patient agrees with discharge plan  Patient well appearing at time of discharge  Please Note: Fluency Direct voice recognition software may have been used in the creation of this document  Wrong words or sound a like substitutions may have occurred due to the inherent limitations of the voice software  Patient Progress  Patient progress: stable      Disposition  Final diagnoses:   Accidental fall, initial encounter   Traumatic hematoma of right eyelid, initial encounter   Right eyelid laceration, initial encounter     Time reflects when diagnosis was documented in both MDM as applicable and the Disposition within this note     Time User Action Codes Description Comment    7/15/2022  9:39 AM Sivakumar Forbes Add Anita Bernstein  XXXA] Accidental fall, initial encounter     7/15/2022  9:40 AM Ferdous, Sheran Collet Add [[id number]] Traumatic hematoma of right eyelid, initial encounter     7/15/2022  9:40 AM Sivakumar Forbes Add [[id number]] Right eyelid laceration, initial encounter       ED Disposition     ED Disposition   Discharge    Condition   Stable    Date/Time   Fri Jul 15, 2022  9:46 AM    Comment   Estrellita Diez discharge to home/self care                 Follow-up Information     Follow up With Specialties Details Why Contact Info Additional Information    Tory Viramontes MD  In 5 days For wound re-check [phone]       [address] [facility] Emergency Department Emergency Medicine  If symptoms worsen [address] [facility] [id number] [address] Emergency Department, [facility] [id number]          Discharge Medication List as of 7/15/2022  9:46 AM      START taking these medications    Details   cephalexin (KEFLEX) 500 mg capsule Take 1 capsule (500 mg total) by mouth every 8 (eight) hours for 7 days, Starting Fri 7/15/2022, Until Fri 7/22/2022, Normal      neomycin-bacitracin-polymyxin b (NEOSPORIN) ointment Apply topically 2 (two) times a day for 7 days, Starting Fri 7/15/2022, Until Fri 7/22/2022, Normal         CONTINUE these medications which have NOT CHANGED    Details   methadone (DOLOPHINE) 10 MG/5ML solution Take 60 mg by mouth daily, Historical Med      albuterol (PROVENTIL HFA,VENTOLIN HFA) 90 mcg/act inhaler Inhale 1-2 puffs every 6 (six) hours as needed for wheezing, Starting Wed 7/19/2017, Print             No discharge procedures on file      PDMP Review     None          ED Provider  Electronically Signed by           Kylee Wood DO  07/15/22 1007

## 2022-11-16 ENCOUNTER — OFFICE VISIT (OUTPATIENT)
Dept: LAB | Facility: HOSPITAL | Age: 32
End: 2022-11-16

## 2022-11-16 DIAGNOSIS — I45.81 LONG QT SYNDROME: ICD-10-CM

## 2022-11-16 DIAGNOSIS — R94.31 NONSPECIFIC ABNORMAL ELECTROCARDIOGRAM (ECG) (EKG): ICD-10-CM

## 2022-11-16 LAB
ATRIAL RATE: 59 BPM
P AXIS: 56 DEGREES
PR INTERVAL: 124 MS
QRS AXIS: 74 DEGREES
QRSD INTERVAL: 82 MS
QT INTERVAL: 430 MS
QTC INTERVAL: 425 MS
T WAVE AXIS: 10 DEGREES
VENTRICULAR RATE: 59 BPM

## 2024-07-29 ENCOUNTER — HOSPITAL ENCOUNTER (EMERGENCY)
Facility: HOSPITAL | Age: 34
Discharge: HOME | End: 2024-07-29
Attending: EMERGENCY MEDICINE
Payer: COMMERCIAL

## 2024-07-29 VITALS
DIASTOLIC BLOOD PRESSURE: 64 MMHG | HEIGHT: 66 IN | HEART RATE: 62 BPM | BODY MASS INDEX: 23.56 KG/M2 | SYSTOLIC BLOOD PRESSURE: 121 MMHG | RESPIRATION RATE: 18 BRPM | WEIGHT: 146.6 LBS | OXYGEN SATURATION: 97 % | TEMPERATURE: 98.9 F

## 2024-07-29 DIAGNOSIS — R11.2 NAUSEA AND VOMITING: ICD-10-CM

## 2024-07-29 DIAGNOSIS — R10.9 ABDOMINAL PAIN: Primary | ICD-10-CM

## 2024-07-29 LAB
ALBUMIN SERPL BCG-MCNC: 4.1 G/DL (ref 3.5–5)
ALP SERPL-CCNC: 57 U/L (ref 34–104)
ALT SERPL W P-5'-P-CCNC: 43 U/L (ref 7–52)
ANION GAP SERPL CALCULATED.3IONS-SCNC: 6 MMOL/L (ref 4–13)
AST SERPL W P-5'-P-CCNC: 36 U/L (ref 13–39)
BACTERIA UR QL AUTO: ABNORMAL /HPF
BASOPHILS # BLD AUTO: 0.08 THOUSANDS/ÂΜL (ref 0–0.1)
BASOPHILS NFR BLD AUTO: 1 % (ref 0–1)
BILIRUB SERPL-MCNC: 0.48 MG/DL (ref 0.2–1)
BILIRUB UR QL STRIP: NEGATIVE
BUN SERPL-MCNC: 8 MG/DL (ref 5–25)
CALCIUM SERPL-MCNC: 9.3 MG/DL (ref 8.4–10.2)
CHLORIDE SERPL-SCNC: 102 MMOL/L (ref 96–108)
CLARITY UR: CLEAR
CO2 SERPL-SCNC: 26 MMOL/L (ref 21–32)
COLOR UR: YELLOW
CREAT SERPL-MCNC: 0.66 MG/DL (ref 0.6–1.3)
EOSINOPHIL # BLD AUTO: 0.47 THOUSAND/ÂΜL (ref 0–0.61)
EOSINOPHIL NFR BLD AUTO: 6 % (ref 0–6)
ERYTHROCYTE [DISTWIDTH] IN BLOOD BY AUTOMATED COUNT: 12.7 % (ref 11.6–15.1)
EXT PREGNANCY TEST URINE: NEGATIVE
EXT. CONTROL: NORMAL
GFR SERPL CREATININE-BSD FRML MDRD: 114 ML/MIN/1.73SQ M
GLUCOSE SERPL-MCNC: 79 MG/DL (ref 65–140)
GLUCOSE UR STRIP-MCNC: NEGATIVE MG/DL
HCT VFR BLD AUTO: 34 % (ref 34.8–46.1)
HGB BLD-MCNC: 11.4 G/DL (ref 11.5–15.4)
HGB UR QL STRIP.AUTO: ABNORMAL
IMM GRANULOCYTES # BLD AUTO: 0.03 THOUSAND/UL (ref 0–0.2)
IMM GRANULOCYTES NFR BLD AUTO: 0 % (ref 0–2)
KETONES UR STRIP-MCNC: NEGATIVE MG/DL
LEUKOCYTE ESTERASE UR QL STRIP: ABNORMAL
LIPASE SERPL-CCNC: 14 U/L (ref 11–82)
LYMPHOCYTES # BLD AUTO: 2.42 THOUSANDS/ÂΜL (ref 0.6–4.47)
LYMPHOCYTES NFR BLD AUTO: 32 % (ref 14–44)
MCH RBC QN AUTO: 29.4 PG (ref 26.8–34.3)
MCHC RBC AUTO-ENTMCNC: 33.5 G/DL (ref 31.4–37.4)
MCV RBC AUTO: 88 FL (ref 82–98)
MONOCYTES # BLD AUTO: 0.43 THOUSAND/ÂΜL (ref 0.17–1.22)
MONOCYTES NFR BLD AUTO: 6 % (ref 4–12)
NEUTROPHILS # BLD AUTO: 4.05 THOUSANDS/ÂΜL (ref 1.85–7.62)
NEUTS SEG NFR BLD AUTO: 55 % (ref 43–75)
NITRITE UR QL STRIP: NEGATIVE
NON-SQ EPI CELLS URNS QL MICRO: ABNORMAL /HPF
NRBC BLD AUTO-RTO: 0 /100 WBCS
PH UR STRIP.AUTO: 5.5 [PH]
PLATELET # BLD AUTO: 273 THOUSANDS/UL (ref 149–390)
PMV BLD AUTO: 10.9 FL (ref 8.9–12.7)
POTASSIUM SERPL-SCNC: 4 MMOL/L (ref 3.5–5.3)
PROT SERPL-MCNC: 7.8 G/DL (ref 6.4–8.4)
PROT UR STRIP-MCNC: NEGATIVE MG/DL
RBC # BLD AUTO: 3.88 MILLION/UL (ref 3.81–5.12)
RBC #/AREA URNS AUTO: ABNORMAL /HPF
SODIUM SERPL-SCNC: 134 MMOL/L (ref 135–147)
SP GR UR STRIP.AUTO: 1.01 (ref 1–1.03)
UROBILINOGEN UR STRIP-ACNC: <2 MG/DL
WBC # BLD AUTO: 7.48 THOUSAND/UL (ref 4.31–10.16)
WBC #/AREA URNS AUTO: ABNORMAL /HPF

## 2024-07-29 PROCEDURE — 83690 ASSAY OF LIPASE: CPT | Performed by: EMERGENCY MEDICINE

## 2024-07-29 PROCEDURE — 36415 COLL VENOUS BLD VENIPUNCTURE: CPT | Performed by: EMERGENCY MEDICINE

## 2024-07-29 PROCEDURE — 81025 URINE PREGNANCY TEST: CPT | Performed by: EMERGENCY MEDICINE

## 2024-07-29 PROCEDURE — 96374 THER/PROPH/DIAG INJ IV PUSH: CPT

## 2024-07-29 PROCEDURE — 99284 EMERGENCY DEPT VISIT MOD MDM: CPT

## 2024-07-29 PROCEDURE — 96375 TX/PRO/DX INJ NEW DRUG ADDON: CPT

## 2024-07-29 PROCEDURE — 80053 COMPREHEN METABOLIC PANEL: CPT | Performed by: EMERGENCY MEDICINE

## 2024-07-29 PROCEDURE — 81001 URINALYSIS AUTO W/SCOPE: CPT | Performed by: EMERGENCY MEDICINE

## 2024-07-29 PROCEDURE — 85025 COMPLETE CBC W/AUTO DIFF WBC: CPT | Performed by: EMERGENCY MEDICINE

## 2024-07-29 PROCEDURE — 96361 HYDRATE IV INFUSION ADD-ON: CPT

## 2024-07-29 PROCEDURE — 99284 EMERGENCY DEPT VISIT MOD MDM: CPT | Performed by: EMERGENCY MEDICINE

## 2024-07-29 RX ORDER — KETOROLAC TROMETHAMINE 30 MG/ML
15 INJECTION, SOLUTION INTRAMUSCULAR; INTRAVENOUS ONCE
Status: COMPLETED | OUTPATIENT
Start: 2024-07-29 | End: 2024-07-29

## 2024-07-29 RX ORDER — ACETAMINOPHEN 10 MG/ML
1000 INJECTION, SOLUTION INTRAVENOUS ONCE
Status: COMPLETED | OUTPATIENT
Start: 2024-07-29 | End: 2024-07-29

## 2024-07-29 RX ORDER — ONDANSETRON 4 MG/1
4 TABLET, FILM COATED ORAL EVERY 6 HOURS PRN
Qty: 12 TABLET | Refills: 0 | Status: SHIPPED | OUTPATIENT
Start: 2024-07-29

## 2024-07-29 RX ORDER — ONDANSETRON 2 MG/ML
4 INJECTION INTRAMUSCULAR; INTRAVENOUS ONCE
Status: COMPLETED | OUTPATIENT
Start: 2024-07-29 | End: 2024-07-29

## 2024-07-29 RX ADMIN — SODIUM CHLORIDE 1000 ML: 0.9 INJECTION, SOLUTION INTRAVENOUS at 14:17

## 2024-07-29 RX ADMIN — KETOROLAC TROMETHAMINE 15 MG: 30 INJECTION, SOLUTION INTRAMUSCULAR; INTRAVENOUS at 14:17

## 2024-07-29 RX ADMIN — ACETAMINOPHEN 1000 MG: 10 INJECTION INTRAVENOUS at 14:18

## 2024-07-29 RX ADMIN — ONDANSETRON 4 MG: 2 INJECTION INTRAMUSCULAR; INTRAVENOUS at 14:17

## 2024-07-29 NOTE — DISCHARGE INSTRUCTIONS
Follow-up with primary care for further care. Contact info provided below if needed.  Use over the counter medications as stated on the bottle as needed for pain control.  Take your new medications as prescribed as needed for nausea.   Return to the ED with new or worsening symptoms.

## 2024-07-29 NOTE — ED NOTES
Pt seen, assessed and d/c by provider. Pt appeared to be in no acute distress upon discharge. Pt able to ambulate well without assistance upon exiting.      Kim Oropeza RN  07/29/24 7122

## 2024-07-29 NOTE — ED PROVIDER NOTES
History  Chief Complaint   Patient presents with    Abdominal Cramping     Pt reports menstrual cycle started yesterday and today approximately an hour ago she started w/ abdominal cramping. Pt c/o N/V w/ hot flashes.      Pt is a 32yo F who presents for abdominal cramping.  Patient reports that she began menstruating yesterday.  Patient states she was having her normal menstrual cramps for which she took ibuprofen earlier this morning.  Patient states approximate 1 hour prior to arrival she had progressive worsening of her cramping over the course of 20 minutes.  Patient states that it was bilateral lower and became severe.  Patient states she has never had menstrual cramps this severe.  Patient reports associated chills, nausea, and vomiting at that time.  Patient therefore came in for further evaluation.  Patient states that the pain has subsided somewhat and is now only a 5 out of 10.  Patient reports she is still having mild nausea.  Patient also reports for the past several days she has been having diarrhea.  Patient reports vomiting and diarrhea have both been nonbloody.  Patient does not believe she is currently pregnant.  Patient reports she is otherwise healthy.  Patient does report she is on daily methadone for which she has not missed any doses including today.        Prior to Admission Medications   Prescriptions Last Dose Informant Patient Reported? Taking?   albuterol (PROVENTIL HFA,VENTOLIN HFA) 90 mcg/act inhaler  Self No No   Sig: Inhale 1-2 puffs every 6 (six) hours as needed for wheezing   Patient not taking: Reported on 9/16/2021   methadone (DOLOPHINE) 10 MG/5ML solution   Yes No   Sig: Take 60 mg by mouth daily   neomycin-bacitracin-polymyxin b (NEOSPORIN) ointment   No No   Sig: Apply topically 2 (two) times a day for 7 days      Facility-Administered Medications: None       Past Medical History:   Diagnosis Date    Drug abuse, IV (HCC)     Endocarditis        Past Surgical History:    Procedure Laterality Date    TONSILLECTOMY         Family History   Problem Relation Age of Onset    Heart attack Mother      I have reviewed and agree with the history as documented.    E-Cigarette/Vaping    E-Cigarette Use Current Every Day User      E-Cigarette/Vaping Substances    Nicotine Yes     THC Yes     CBD No     Flavoring No     Other No     Unknown No      Social History     Tobacco Use    Smoking status: Former     Current packs/day: 0.50     Types: Cigarettes    Smokeless tobacco: Never    Tobacco comments:     not using cigarettes   Vaping Use    Vaping status: Every Day    Substances: Nicotine, THC   Substance Use Topics    Alcohol use: Yes     Comment: occasional    Drug use: Yes     Types: Heroin, Other, Marijuana     Comment: last heroin use 7/14 PM, THC vaper, and methadone 140mg daily       Review of Systems   Constitutional:  Positive for chills.   Gastrointestinal:  Positive for abdominal pain, diarrhea, nausea and vomiting.   Genitourinary:  Positive for vaginal bleeding.   All other systems reviewed and are negative.      Physical Exam  Physical Exam  Vitals reviewed.   Constitutional:       General: She is not in acute distress.     Appearance: She is well-developed. She is not toxic-appearing or diaphoretic.   HENT:      Head: Normocephalic and atraumatic.      Right Ear: External ear normal.      Left Ear: External ear normal.      Nose: Nose normal.      Mouth/Throat:      Pharynx: Oropharynx is clear.   Eyes:      Extraocular Movements: Extraocular movements intact.      Pupils: Pupils are equal, round, and reactive to light.   Cardiovascular:      Rate and Rhythm: Normal rate and regular rhythm.      Heart sounds: Normal heart sounds. No murmur heard.  Pulmonary:      Effort: Pulmonary effort is normal. No respiratory distress.      Breath sounds: Normal breath sounds. No wheezing.   Abdominal:      General: There is no distension.      Palpations: Abdomen is soft.      Tenderness:  There is no abdominal tenderness. There is no guarding or rebound.   Musculoskeletal:         General: Normal range of motion.      Cervical back: Normal range of motion and neck supple.      Right lower leg: No edema.      Left lower leg: No edema.   Skin:     General: Skin is warm.      Capillary Refill: Capillary refill takes less than 2 seconds.      Coloration: Skin is not pale.      Findings: No erythema or rash.   Neurological:      General: No focal deficit present.      Mental Status: She is alert and oriented to person, place, and time.   Psychiatric:         Speech: Speech normal.         Behavior: Behavior is cooperative.         Vital Signs  ED Triage Vitals [07/29/24 1315]   Temperature Pulse Respirations Blood Pressure SpO2   98.9 °F (37.2 °C) 70 20 125/63 97 %      Temp Source Heart Rate Source Patient Position - Orthostatic VS BP Location FiO2 (%)   Oral Monitor Sitting Right arm --      Pain Score       5           Vitals:    07/29/24 1315 07/29/24 1510 07/29/24 1735   BP: 125/63 128/68 121/64   Pulse: 70 72 62   Patient Position - Orthostatic VS: Sitting           Visual Acuity      ED Medications  Medications   ketorolac (TORADOL) injection 15 mg (15 mg Intravenous Given 7/29/24 1417)   acetaminophen (Ofirmev) injection 1,000 mg (0 mg Intravenous Stopped 7/29/24 1433)   ondansetron (ZOFRAN) injection 4 mg (4 mg Intravenous Given 7/29/24 1417)   sodium chloride 0.9 % bolus 1,000 mL (0 mL Intravenous Stopped 7/29/24 1517)       Diagnostic Studies  Results Reviewed       Procedure Component Value Units Date/Time    Urine Microscopic [[id number]]  (Abnormal) Collected: 07/29/24 1610    Lab Status: Final result Specimen: Urine, Clean Catch Updated: 07/29/24 1726     RBC, UA 30-50 /hpf      WBC, UA 1-2 /hpf      Epithelial Cells Occasional /hpf      Bacteria, UA Occasional /hpf     POCT pregnancy, urine [[id number]]  (Normal) Resulted: 07/29/24 1634    Lab Status: Final result Updated: 07/29/24 1634      EXT Preg Test, Ur Negative     Control Valid    UA (URINE) with reflex to Scope [[id number]]  (Abnormal) Collected: 07/29/24 1610    Lab Status: Final result Specimen: Urine, Clean Catch Updated: 07/29/24 1621     Color, UA Yellow     Clarity, UA Clear     Specific Gravity, UA 1.015     pH, UA 5.5     Leukocytes, UA Moderate     Nitrite, UA Negative     Protein, UA Negative mg/dl      Glucose, UA Negative mg/dl      Ketones, UA Negative mg/dl      Urobilinogen, UA <2.0 mg/dl      Bilirubin, UA Negative     Occult Blood, UA Large    Comprehensive metabolic panel [[id number]]  (Abnormal) Collected: 07/29/24 1414    Lab Status: Final result Specimen: Blood from Arm, Left Updated: 07/29/24 1454     Sodium 134 mmol/L      Potassium 4.0 mmol/L      Chloride 102 mmol/L      CO2 26 mmol/L      ANION GAP 6 mmol/L      BUN 8 mg/dL      Creatinine 0.66 mg/dL      Glucose 79 mg/dL      Calcium 9.3 mg/dL      AST 36 U/L      ALT 43 U/L      Alkaline Phosphatase 57 U/L      Total Protein 7.8 g/dL      Albumin 4.1 g/dL      Total Bilirubin 0.48 mg/dL      eGFR 114 ml/min/1.73sq m     Narrative:      National Kid[employer] Foundation guidelines for Chronic Kidney Disease (CKD):     Stage 1 with normal or high GFR (GFR > 90 mL/min/1.73 square meters)    Stage 2 Mild CKD (GFR = 60-89 mL/min/1.73 square meters)    Stage 3A Moderate CKD (GFR = 45-59 mL/min/1.73 square meters)    Stage 3B Moderate CKD (GFR = 30-44 mL/min/1.73 square meters)    Stage 4 Severe CKD (GFR = 15-29 mL/min/1.73 square meters)    Stage 5 End Stage CKD (GFR <15 mL/min/1.73 square meters)  Note: GFR calculation is accurate only with a steady state creatinine    Lipase [[id number]]  (Normal) Collected: 07/29/24 1414    Lab Status: Final result Specimen: Blood from Arm, Left Updated: 07/29/24 1454     Lipase 14 u/L     CBC and differential [[id number]]  (Abnormal) Collected: 07/29/24 1414    Lab Status: Final result Specimen: Blood from Arm, Left Updated: 07/29/24 1424      WBC 7.48 Thousand/uL      RBC 3.88 Million/uL      Hemoglobin 11.4 g/dL      Hematocrit 34.0 %      MCV 88 fL      MCH 29.4 pg      MCHC 33.5 g/dL      RDW 12.7 %      MPV 10.9 fL      Platelets 273 Thousands/uL      nRBC 0 /100 WBCs      Segmented % 55 %      Immature Grans % 0 %      Lymphocytes % 32 %      Monocytes % 6 %      Eosinophils Relative 6 %      Basophils Relative 1 %      Absolute Neutrophils 4.05 Thousands/µL      Absolute Immature Grans 0.03 Thousand/uL      Absolute Lymphocytes 2.42 Thousands/µL      Absolute Monocytes 0.43 Thousand/µL      Eosinophils Absolute 0.47 Thousand/µL      Basophils Absolute 0.08 Thousands/µL                    No orders to display              Procedures  Procedures         ED Course  ED Course as of 07/29/24 1749   Mon Jul 29, 2024   1425 CBC and differential(!)  Reviewed and without actionable derangement.    1425 Hemoglobin(!): 11.4  Anemia improved from most recent prior.    1456 Comprehensive metabolic panel(!)  Reviewed and without actionable derangement.    1456 LIPASE: 14  WNL   1502 Awaiting urine sample.    1621 Leukocytes, UA(!): Moderate   1621 Nitrite, UA: Negative   1621 Blood, UA(!): Large  Possibly due to concurrent vaginal bleeding. Awaiting micro.    1635 PREGNANCY TEST URINE: Negative  Negative.    1726 WBC, UA: 1-2   1726 Bacteria, UA: Occasional   1726 Epithelial Cells: Occasional  Not convincing for UTI.                                  SBIRT 22yo+      Flowsheet Row Most Recent Value   Initial Alcohol Screen: US AUDIT-C     1. How often do you have a drink containing alcohol? 0 Filed at: 07/29/2024 1319   2. How many drinks containing alcohol do you have on a typical day you are drinking?  0 Filed at: 07/29/2024 1319   3a. Male UNDER 65: How often do you have five or more drinks on one occasion? 0 Filed at: 07/29/2024 1319   3b. FEMALE Any Age, or MALE 65+: How often do you have 4 or more drinks on one occassion? 0 Filed at: 07/29/2024 1319    Audit-C Score 0 Filed at: 07/29/2024 1310   SISI: How many times in the past year have you...    Used an illegal drug or used a prescription medication for non-medical reasons? Never Filed at: 07/29/2024 1319                      Medical Decision Making  Pt is a 32yo F who presents with abdominal pain.  Exam pertinent for benign abdominal exam.    Differential diagnosis to include but not limited to menstrual cramps, UTI, ovarian cyst, pregnancy.  Will plan for labs and urinalysis.  Will treat symptomatically and reassess.  See ED course for results and details. Pt reassessed, resting comfortably, and tolerating PO without difficulty.     Plan to discharge pt with f/u to PCP. Discussed returning the ED with new or worsening of symptoms. Discussed use of over the counter medications as stated on the bottle as needed for pain. Discussed taking new medication as prescribed as needed for nausea. Pt expressed understanding of discharge instructions, return precautions, and medication instructions and is stable for discharge at this time. All questions were answered and pt was discharged without incident.       Amount and/or Complexity of Data Reviewed  Labs: ordered. Decision-making details documented in ED Course.    Risk  Prescription drug management.                 Disposition  Final diagnoses:   Abdominal pain   Nausea and vomiting     Time reflects when diagnosis was documented in both MDM as applicable and the Disposition within this note       Time User Action Codes Description Comment    7/29/2024  5:32 PM Brenda Cook Add [R10.9] Abdominal pain     7/29/2024  5:32 PM Brenda Cook Add [R11.2] Nausea and vomiting           ED Disposition       ED Disposition   Discharge    Condition   Stable    Date/Time   Mon Jul 29, 2024 4167    Comment   Kim Campoverde discharge to home/self care.                   Follow-up Information       Follow up With Specialties Details Why Contact Info    Rolf Marquez MD   Call  As needed [address]  [address]  [facility] [address]  [phone]              Discharge Medication List as of 7/29/2024  5:32 PM        START taking these medications    Details   ondansetron (ZOFRAN) 4 mg tablet Take 1 tablet (4 mg total) by mouth every 6 (six) hours as needed for nausea or vomiting, Starting Mon 7/29/2024, Normal           CONTINUE these medications which have NOT CHANGED    Details   albuterol (PROVENTIL HFA,VENTOLIN HFA) 90 mcg/act inhaler Inhale 1-2 puffs every 6 (six) hours as needed for wheezing, Starting Wed 7/19/2017, Print      methadone (DOLOPHINE) 10 MG/5ML solution Take 60 mg by mouth daily, Historical Med      neomycin-bacitracin-polymyxin b (NEOSPORIN) ointment Apply topically 2 (two) times a day for 7 days, Starting Fri 7/15/2022, Until Fri 7/22/2022, Normal             No discharge procedures on file.    PDMP Review       None            ED Provider  Electronically Signed by             Brenda Cook MD  07/29/24 3936

## 2025-03-17 ENCOUNTER — HOSPITAL ENCOUNTER (EMERGENCY)
Facility: HOSPITAL | Age: 35
Discharge: HOME | End: 2025-03-17
Payer: COMMERCIAL

## 2025-03-17 VITALS
WEIGHT: 126.2 LBS | HEART RATE: 95 BPM | TEMPERATURE: 98.5 F | RESPIRATION RATE: 20 BRPM | SYSTOLIC BLOOD PRESSURE: 123 MMHG | DIASTOLIC BLOOD PRESSURE: 60 MMHG | BODY MASS INDEX: 20.37 KG/M2 | OXYGEN SATURATION: 96 %

## 2025-03-17 DIAGNOSIS — F42.4 PICKING OWN SKIN: ICD-10-CM

## 2025-03-17 DIAGNOSIS — B00.1 HERPES LABIALIS: Primary | ICD-10-CM

## 2025-03-17 PROCEDURE — 99284 EMERGENCY DEPT VISIT MOD MDM: CPT

## 2025-03-17 PROCEDURE — 99283 EMERGENCY DEPT VISIT LOW MDM: CPT

## 2025-03-17 RX ORDER — ACYCLOVIR 400 MG/1
400 TABLET ORAL
Qty: 25 TABLET | Refills: 0 | Status: SHIPPED | OUTPATIENT
Start: 2025-03-17 | End: 2025-03-22

## 2025-03-17 NOTE — DISCHARGE INSTRUCTIONS
Monitor for signs of skin infection such as redness, warmth, swelling, drainage. Use acyclovir for cold sore. Follow up with your PCP as needed.   If you develop new or worsening symptoms, please return to the Emergency Department for further evaluation.

## 2025-03-17 NOTE — ED PROVIDER NOTES
Time reflects when diagnosis was documented in both MDM as applicable and the Disposition within this note       Time User Action Codes Description Comment    3/17/2025  4:54 PM Jaylon Moiselas Add [B00.1] Herpes labialis     3/17/2025  4:54 PM Jaylon Moiselas Add [F42.4] Picking own skin           ED Disposition       ED Disposition   Discharge    Condition   Stable    Date/Time   Mon Mar 17, 2025  4:54 PM    Comment   Kim Campoverde discharge to home/self care.                   Assessment & Plan       Medical Decision Making      35 y/o F presents for evaluation of rash. States she thinks she has as cold sore as well as rash on her hands and face. Reports picking at the skin of her hands. Some drainage from cold sore. No known fever, chills, nausea, vomiting, chest pain. Reports to triage nursing methamphetamine use.   Pt non toxic appearing. Several picked skin areas on face and hands. B/l edges of lower lip has vesicular appearing lesions. Oral mucosa otherwise clear.   Suspect combination of skin picking and herpes labialis. Will treat with acyclovir. S/s to monitor discussed with pt with RTED precautions given.            Medications - No data to display    ED Risk Strat Scores                            SBIRT 22yo+      Flowsheet Row Most Recent Value   Initial Alcohol Screen: US AUDIT-C     1. How often do you have a drink containing alcohol? 0 Filed at: 03/17/2025 1649   2. How many drinks containing alcohol do you have on a typical day you are drinking?  0 Filed at: 03/17/2025 1649   3a. Male UNDER 65: How often do you have five or more drinks on one occasion? 0 Filed at: 03/17/2025 1649   3b. FEMALE Any Age, or MALE 65+: How often do you have 4 or more drinks on one occassion? 0 Filed at: 03/17/2025 1649   Audit-C Score 0 Filed at: 03/17/2025 1649   SISI: How many times in the past year have you...    Used an illegal drug or used a prescription medication for non-medical reasons? Never Filed at:  03/17/2025 3789                            History of Present Illness       Chief Complaint   Patient presents with    Rash     Patient wearing black gloves. States started with fever blisters in her nose that are now on her face, scalp and hands. States has been picking. Patient reluctant to discuss with friend in room, friend goes to waiting room. States she just started smoking methamphetamine. [facility] for Methadone , still using Fentanyl        Past Medical History:   Diagnosis Date    Drug abuse, IV (HCC)     Endocarditis     Methamphetamine use (HCC)     Polydrug abuse (HCC)       Past Surgical History:   Procedure Laterality Date    TONSILLECTOMY        Family History   Problem Relation Age of Onset    Heart attack Mother       Social History     Tobacco Use    Smoking status: Former     Current packs/day: 0.50     Types: Cigarettes    Smokeless tobacco: Never    Tobacco comments:     not using cigarettes   Vaping Use    Vaping status: Every Day    Substances: Nicotine   Substance Use Topics    Alcohol use: Yes     Comment: occasional    Drug use: Yes     Types: Heroin, Other, Marijuana, Methamphetamines, Fentanyl     Comment: [facility]      E-Cigarette/Vaping    E-Cigarette Use Current Every Day User       E-Cigarette/Vaping Substances    Nicotine Yes     THC No     CBD No     Flavoring No     Other No     Unknown No       I have reviewed and agree with the history as documented.     HPI  See mdm  Review of Systems   Constitutional:  Negative for chills and fever.   HENT:  Negative for ear pain and sore throat.    Eyes:  Negative for pain and visual disturbance.   Respiratory:  Negative for cough and shortness of breath.    Cardiovascular:  Negative for chest pain and palpitations.   Gastrointestinal:  Negative for abdominal pain and vomiting.   Genitourinary:  Negative for dysuria and hematuria.   Musculoskeletal:  Negative for arthralgias and back pain.   Skin:  Positive for rash. Negative  for color change.   Neurological:  Negative for seizures and syncope.   All other systems reviewed and are negative.          Objective       ED Triage Vitals [03/17/25 1641]   Temperature Pulse Blood Pressure Respirations SpO2 Patient Position - Orthostatic VS   98.5 °F (36.9 °C) 95 123/60 20 96 % Sitting      Temp Source Heart Rate Source BP Location FiO2 (%) Pain Score    Tympanic Monitor Left arm -- --      Vitals      Date and Time Temp Pulse SpO2 Resp BP Pain Score FACES Pain Rating User   03/17/25 1641 98.5 °F (36.9 °C) 95 96 % 20 123/60 -- -- SW            Physical Exam  Vitals and nursing note reviewed.   Constitutional:       General: She is not in acute distress.  HENT:      Head: Normocephalic and atraumatic.      Right Ear: External ear normal.      Left Ear: External ear normal.      Nose: Nose normal.      Mouth/Throat:      Pharynx: Oropharynx is clear.      Comments: Vesicular lesions b/l corners of mouth. No intraoral/palatal lesions  Eyes:      Extraocular Movements: Extraocular movements intact.      Pupils: Pupils are equal, round, and reactive to light.   Cardiovascular:      Rate and Rhythm: Normal rate and regular rhythm.      Pulses: Normal pulses.      Heart sounds: Normal heart sounds. No murmur heard.     No friction rub. No gallop.   Pulmonary:      Effort: Pulmonary effort is normal. No respiratory distress.      Breath sounds: Normal breath sounds. No wheezing, rhonchi or rales.   Abdominal:      General: Abdomen is flat. There is no distension.      Palpations: Abdomen is soft.      Tenderness: There is no abdominal tenderness. There is no guarding or rebound.   Musculoskeletal:         General: No deformity. Normal range of motion.      Cervical back: Normal range of motion.      Right lower leg: No edema.      Left lower leg: No edema.   Skin:     General: Skin is warm and dry.      Capillary Refill: Capillary refill takes less than 2 seconds.      Findings: Rash present.       Comments: Several sites of picked skin on face and hands. No redness, drainage, crusting noted    Neurological:      General: No focal deficit present.      Mental Status: She is alert and oriented to person, place, and time.      Gait: Gait normal.   Psychiatric:         Mood and Affect: Mood normal.         Results Reviewed       None            No orders to display       Procedures    ED Medication and Procedure Management   Prior to Admission Medications   Prescriptions Last Dose Informant Patient Reported? Taking?   albuterol (PROVENTIL HFA,VENTOLIN HFA) 90 mcg/act inhaler  Self No No   Sig: Inhale 1-2 puffs every 6 (six) hours as needed for wheezing   Patient not taking: Reported on 9/16/2021   methadone (DOLOPHINE) 10 MG/5ML solution   Yes No   Sig: Take 195 mg by mouth daily States she is getting 195 [facility] at [facility]   neomycin-bacitracin-polymyxin b (NEOSPORIN) ointment   No No   Sig: Apply topically 2 (two) times a day for 7 days   ondansetron (ZOFRAN) 4 mg tablet   No No   Sig: Take 1 tablet (4 mg total) by mouth every 6 (six) hours as needed for nausea or vomiting      Facility-Administered Medications: None     Patient's Medications   Discharge Prescriptions    ACYCLOVIR (ZOVIRAX) 400 MG TABLET    Take 1 tablet (400 mg total) by mouth every 4 (four) hours while awake for 5 days       Start Date: 3/17/2025 End Date: 3/22/2025       Order Dose: 400 mg       Quantity: 25 tablet    Refills: 0     No discharge procedures on file.  ED SEPSIS DOCUMENTATION   Time reflects when diagnosis was documented in both MDM as applicable and the Disposition within this note       Time User Action Codes Description Comment    3/17/2025  4:54 PM Barber Moise [B00.1] Herpes labialis     3/17/2025  4:54 PM Barber Moise [F42.4] Picking own skin                  Barber Moise MD  03/17/25 1700